# Patient Record
Sex: MALE | Race: OTHER | NOT HISPANIC OR LATINO | URBAN - METROPOLITAN AREA
[De-identification: names, ages, dates, MRNs, and addresses within clinical notes are randomized per-mention and may not be internally consistent; named-entity substitution may affect disease eponyms.]

---

## 2017-06-25 PROBLEM — Z00.00 ENCOUNTER FOR PREVENTIVE HEALTH EXAMINATION: Status: ACTIVE | Noted: 2017-06-25

## 2018-10-20 ENCOUNTER — EMERGENCY (EMERGENCY)
Facility: HOSPITAL | Age: 48
LOS: 0 days | Discharge: HOME | End: 2018-10-20
Admitting: PHYSICIAN ASSISTANT

## 2018-10-20 VITALS — SYSTOLIC BLOOD PRESSURE: 132 MMHG | HEART RATE: 70 BPM | DIASTOLIC BLOOD PRESSURE: 80 MMHG

## 2018-10-20 VITALS
RESPIRATION RATE: 18 BRPM | OXYGEN SATURATION: 98 % | SYSTOLIC BLOOD PRESSURE: 145 MMHG | TEMPERATURE: 98 F | HEART RATE: 82 BPM | DIASTOLIC BLOOD PRESSURE: 101 MMHG
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
## 2018-10-20 DIAGNOSIS — S92.352A DISPLACED FRACTURE OF FIFTH METATARSAL BONE, LEFT FOOT, INITIAL ENCOUNTER FOR CLOSED FRACTURE: ICD-10-CM
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
## 2018-10-20 DIAGNOSIS — M25.579 PAIN IN UNSPECIFIED ANKLE AND JOINTS OF UNSPECIFIED FOOT: ICD-10-CM

## 2018-10-20 RX ORDER — OXYCODONE AND ACETAMINOPHEN 5; 325 MG/1; MG/1
1 TABLET ORAL ONCE
Qty: 0 | Refills: 0 | Status: DISCONTINUED | OUTPATIENT
Start: 2018-10-20 | End: 2018-10-20

## 2018-10-20 RX ADMIN — OXYCODONE AND ACETAMINOPHEN 1 TABLET(S): 5; 325 TABLET ORAL at 17:36

## 2018-10-20 NOTE — ED PROVIDER NOTE - NS ED ROS FT
CONST: No fever, chills or bodyaches  EYES: No pain, redness, drainage or visual changes.  ENT: No ear pain or discharge, nasal discharge or congestion. No sore throat  CARD: No chest pain, palpitations  RESP: No SOB, cough, hemoptysis. No hx of asthma or COPD  MS:+L foot pain

## 2018-10-20 NOTE — ED PROVIDER NOTE - OBJECTIVE STATEMENT
Pt is a 47 yo M c/o L foot pain sp twisting injury last night. Pt sts he was getting up from using the toilet and twisted the ankle and heard a crack in foot. Sts he tried walking on it since yesterday but it was getting worse with swelling. No othe rinjuries.

## 2018-10-20 NOTE — ED PROVIDER NOTE - PHYSICAL EXAMINATION
CONST: Well appearing in NAD  EYES: PERRL, EOMI, Sclera and conjunctiva clear.  ENT: No nasal discharge. TM's clear B/L without drainage. Oropharynx normal appearing, no erythema or exudates. Uvula midline.  NECK: Non-tender, no meningeal signs  CARD: Normal S1 S2; Normal rate and rhythm  RESP: Equal BS B/L, No wheezes, rhonchi or rales. No distress  MS: Normal ROM in all extremities. +Tenderness with swelling over L 5th metatarsal

## 2018-10-20 NOTE — ED ADULT NURSE NOTE - NS ED NURSE DC PT EDUCATION RESOURCES
[facility]  [address], [place]   Phone: [phone]  Fax: [phone]        PROGRESS NOTE      HISTORY OF PRESENT ILLNESS:  The patient is a 48 y.o. female and was seen today for follow up of low back pain radiating into the BLE (L>R) extending in an S1 distribution to the ankle with paraesthesias. No specific injury/trauma. Her pain is not positional. Pt denies h/o lumbar spinal surgery or blocks. She has not attended physical therapy/chiropractor for the lumbar spine. I last saw patient on 6/9/13 neck pain into the LUE, with lesser symptoms in the RUE. Of note, her pain was unrelieved with Morphine. I referred her to Dr. Joseph Jacobson for surgical evaluation. This is a patient with a diagnosis of cervical postlaminectomy syndrome. She was previously seen by Dr. Joseph Jacobson on 4/24/14 and was noted to be 6 months out from her C4-T3 ACDF with significant relief. Of note, she should continue to be followed by Dr. Rosa Elena Pulido for pain management. Per patient, she was abruptly discontinued off her Oxycodone and Morphine by Dr. Rosa Elena Pulido and not weaned off roughly 2-3 years ago and wished to no longer be under his care. ER note dated 6/4/17 indicating patient presented for chronic low back pain. She was treated with Norco, Valium and Prednisone at that time. Pt is noting good relief with Valium and Prednisone. Note from Dr. Jennifer Fong dated 6/5/17 indicating patient was seen with c/o severe hand and back pain which are not relieved with Norco or NSAIDs. Raynaud's is controlled with Procardia and Trental. Of note, patient continues to use marijuana for pain control. It appears as though Dr. Jennifer Fong offered to refer patient to pain management which she declined. Pt reports somnolence with CYMBALTA 30 mg. She states she is no longer taking Zoloft, Xanax or Ambien. Noted, patient has taken Neurontin in the past but does not recall what dose she was placed on.  Pt denies change in bowel or bladder habits. Pt denies fever, weight loss, or skin changes. Pt denies h/o stomach ulcers or bleeding disorders. Lumbar spine CT dated 6/4/17 reviewed. Per report, no acute osseous abnormality of the lumbar spine. Transitional lumbosacral anatomy. multilevel degenerative disc disease and facet arthropathy. Atherosclerosis. Lumbar spine MRI dated 7/29/17 reviewed. Per report, transitional lumbosacral vertebra with partial lumbarization of S1, hypoplastic otherwise unremarkable S1-2 level. 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 Severe L3-4 and moderate L4-5 central stenosis, from a combination of disc bulging and spondylosis with outer annular fissure, associated mild to moderate facet joint osteoarthritis and ligamentum flavum thickening/buckling. Superimposed moderate-sized right posterolateral extruded L3-4 disc herniation with inferior migration of sizable fragment in the right anterior epidural space to nearly the L4-5 disc level, mild flattening of the right ventral thecal sac with impingement of the right traversing L4 and posterior defection of descending intrathecal roots. Moderate degenerative disc disease with mild disc bulging and spondylosis, L5-S1. Minimal disc bulge, L2-3. Mild multilevel foraminal stenosis but no evident exiting root impingement. The patient is RHD. At her last clinical appointment, patient was neurologically intact. She wished to attempt an increased dose of Lyrica. I increased her Lyrica to 150 mg BID. The patient returns today with continued paresthesias in her L calf. She rates pain 0/10, an improvement since her last visit (3/10). She has tolerated the increased dose of Lyrica (150 mg BID) well and reports additional benefit. She notes improvement in distal BLE paraesthesias.  reviewed.        Past Medical History:   Diagnosis Date    Cancer [facility])     Skin    Chronic airway obstruction, not elsewhere classified     Essential hypertension, benign     MILDLY ELEVATED    Nausea & vomiting     Nicotine vapor product user     Personal history of tobacco use, presenting hazards to health     Psychiatric disorder     Depression    Raynaud's disease     Shortness of breath     NORMAL STRESS TEST AND LV FUNCTION. LIKELY FROM COPD AND SMOKING        Social History     Social History    Marital status:      Spouse name: N/A    Number of children: N/A    Years of education: N/A     Occupational History    Not on file. Social History Main Topics    Smoking status: Former Smoker     Packs/day: 0.50     Years: 30.00    Smokeless tobacco: Never Used      Comment: Uses a Nicotine    Alcohol use No    Drug use: Not on file    Sexual activity: Not on file     Other Topics Concern    Not on file     Social History Narrative       Current Outpatient Prescriptions   Medication Sig Dispense Refill    pregabalin (LYRICA) 225 mg capsule Take 1 Cap by mouth two (2) times a day. Max Daily Amount: 450 mg. 60 Cap 1    pregabalin (LYRICA) 150 mg capsule Take 1 Cap by mouth two (2) times a day. Max Daily Amount: 300 mg. 60 Cap 1    diclofenac EC (VOLTAREN) 75 mg EC tablet Take  by mouth.  NIFEdipine XL (PROCARDIA XL) 30 mg tablet Take 60 mg by mouth daily. Indications: HYPERTENSION      HYDROcodone-acetaminophen (NORCO) 7.5-325 mg per tablet Take  by mouth.  diazePAM (VALIUM) 5 mg tablet Take 5 mg by mouth every six (6) hours as needed for Anxiety.  DULoxetine (CYMBALTA) 30 mg capsule Take 1 Cap by mouth daily. 30 Cap 1    oxyCODONE-acetaminophen (PERCOCET)  mg per tablet Take 1 Tab by mouth every eight (8) hours as needed. Indications: PAIN 90 Tab 0    Morphine (OTILIO) 30 mg ER capsule Take 1 Cap by mouth three (3) times daily. Indications: CHRONIC PAIN 90 Cap 0    sertraline (ZOLOFT) 100 mg tablet Take  by mouth daily. Indications: Depression      ALPRAZolam (XANAX) 0.5 mg tablet Take  by mouth three (3) times daily as needed.  Indications: ANXIETY WITH DEPRESSION      pentoxifylline CR (TRENTAL) 400 mg CR tablet Take 400 mg by mouth three (3) times daily (with meals).  primidone (MYSOLINE) 50 mg tablet Take  by mouth nightly. Indications: for tremors      zolpidem (AMBIEN) 10 mg tablet Take  by mouth nightly as needed. No Known Allergies      PHYSICAL EXAMINATION    Visit Vitals    /82 (BP 1 Location: Left arm, BP Patient Position: Sitting)    Pulse 64    Resp 16    Ht 5' 9\" (1.753 m)    Wt 133 lb 9.6 oz (60.6 kg)    SpO2 (!) 89%    BMI 19.73 kg/m2       CONSTITUTIONAL: NAD, A&O x 3  SENSATION: Intact to light touch throughout  RANGE OF MOTION: The patient has full passive range of motion in all four extremities. MOTOR:  Straight Leg Raise: Negative, bilateral               Hip Flex Knee Ext Knee Flex Ankle DF GTE Ankle PF Tone   Right +4/5 +4/5 +4/5 +4/5 +4/5 +4/5 +4/5   Left +4/5 +4/5 +4/5 +4/5 +4/5 +4/5 +4/5       ASSESSMENT   Diagnoses and all orders for this visit:    1. Spondylosis of lumbar region without myelopathy or radiculopathy  -     pregabalin (LYRICA) 225 mg capsule; Take 1 Cap by mouth two (2) times a day. Max Daily Amount: 450 mg.    2. Other intervertebral disc degeneration, lumbar region  -     pregabalin (LYRICA) 225 mg capsule; Take 1 Cap by mouth two (2) times a day. Max Daily Amount: 450 mg.    3. Radiculopathy, lumbar region  -     pregabalin (LYRICA) 225 mg capsule; Take 1 Cap by mouth two (2) times a day. Max Daily Amount: 450 mg.          IMPRESSION AND PLAN:  I will increase the pt's Lyrica to 225 mg BID. Patient advised to call the office if intolerant to higher dose. I will see the patient back in 1 month's time or earlier if needed. Written by Shanti Hernandez, as dictated by Tony Zarate MD  I examined the patient, reviewed and agree with the note. Yes

## 2022-04-20 ENCOUNTER — EMERGENCY (EMERGENCY)
Facility: HOSPITAL | Age: 52
LOS: 0 days | Discharge: HOME | End: 2022-04-20
Attending: EMERGENCY MEDICINE | Admitting: EMERGENCY MEDICINE
Payer: MEDICAID

## 2022-04-20 VITALS
DIASTOLIC BLOOD PRESSURE: 77 MMHG | HEIGHT: 68 IN | RESPIRATION RATE: 18 BRPM | HEART RATE: 79 BPM | TEMPERATURE: 98 F | SYSTOLIC BLOOD PRESSURE: 156 MMHG | OXYGEN SATURATION: 97 % | WEIGHT: 205.91 LBS

## 2022-04-20 DIAGNOSIS — M54.89 OTHER DORSALGIA: ICD-10-CM

## 2022-04-20 DIAGNOSIS — M54.9 DORSALGIA, UNSPECIFIED: ICD-10-CM

## 2022-04-20 PROCEDURE — 93010 ELECTROCARDIOGRAM REPORT: CPT

## 2022-04-20 PROCEDURE — 99284 EMERGENCY DEPT VISIT MOD MDM: CPT

## 2022-04-20 RX ORDER — METHOCARBAMOL 500 MG/1
1 TABLET, FILM COATED ORAL
Qty: 0 | Refills: 0 | DISCHARGE
Start: 2022-04-20 | End: 2022-04-22

## 2022-04-20 RX ORDER — KETOROLAC TROMETHAMINE 30 MG/ML
60 SYRINGE (ML) INJECTION ONCE
Refills: 0 | Status: DISCONTINUED | OUTPATIENT
Start: 2022-04-20 | End: 2022-04-20

## 2022-04-20 RX ORDER — METHOCARBAMOL 500 MG/1
2 TABLET, FILM COATED ORAL
Qty: 18 | Refills: 0
Start: 2022-04-20 | End: 2022-04-22

## 2022-04-20 RX ADMIN — Medication 60 MILLIGRAM(S): at 21:49

## 2022-04-20 NOTE — ED PROVIDER NOTE - ATTENDING CONTRIBUTION TO CARE
pt co right upper back pain for 1 week. sharp, spasm like, worse with movement. feels like a "knot". no cp or sob. no trauma.     pt in nad, ctab, rrr. tender to right para thoracic muscles, point tenderness +spasm. nvi. nfd.

## 2022-04-20 NOTE — ED PROVIDER NOTE - PATIENT PORTAL LINK FT
You can access the FollowMyHealth Patient Portal offered by [facility] by registering at the following website: [email or web address]. By joining [employer]’s FollowMyHealth portal, you will also be able to view your health information using other applications (apps) compatible with our system.

## 2022-04-20 NOTE — ED ADULT TRIAGE NOTE - CHIEF COMPLAINT QUOTE
I have some muscle cramp in the back for a week, its not getting better. I'm using heat packs, Motrin, nothing is working. Now it's going to my right arm - patient

## 2022-04-20 NOTE — ED PROVIDER NOTE - OBJECTIVE STATEMENT
52 year old with no phmx comes in for right paraspinal back pain for 1 week. pt denies any fevers, chills. pt's pain started 1 week ago and woke up with it. pt was prescribed tizanidine by a friend which helped slightly and also used tylenol and motrin. pt has no other complaints.

## 2022-04-20 NOTE — ED PROVIDER NOTE - PHYSICAL EXAMINATION
CONSTITUTIONAL: Well-developed; well-nourished; in no acute distress.   SKIN: warm, dry  HEAD: Normocephalic; atraumatic.  EYES: PERRL, EOMI, normal sclera and conjunctiva   ENT: No nasal discharge; airway clear.  NECK: Supple; non tender.  BACK: right upper back pain with point tenderness over scapular region  CARD:  Regular rate and rhythm.   RESP: NO inc WOB   ABD: soft ntnd  EXT: Normal ROM.    LYMPH: No acute cervical adenopathy.  NEURO: Alert, oriented, grossly unremarkable  PSYCH: Cooperative, appropriate.

## 2022-04-22 ENCOUNTER — EMERGENCY (EMERGENCY)
Facility: HOSPITAL | Age: 52
LOS: 0 days | Discharge: HOME | End: 2022-04-22
Attending: EMERGENCY MEDICINE | Admitting: EMERGENCY MEDICINE
Payer: MEDICAID

## 2022-04-22 VITALS
SYSTOLIC BLOOD PRESSURE: 141 MMHG | OXYGEN SATURATION: 99 % | HEIGHT: 68 IN | RESPIRATION RATE: 18 BRPM | HEART RATE: 67 BPM | DIASTOLIC BLOOD PRESSURE: 84 MMHG | WEIGHT: 207.9 LBS | TEMPERATURE: 97 F

## 2022-04-22 DIAGNOSIS — M54.9 DORSALGIA, UNSPECIFIED: ICD-10-CM

## 2022-04-22 DIAGNOSIS — M62.830 MUSCLE SPASM OF BACK: ICD-10-CM

## 2022-04-22 DIAGNOSIS — F17.200 NICOTINE DEPENDENCE, UNSPECIFIED, UNCOMPLICATED: ICD-10-CM

## 2022-04-22 PROCEDURE — 99284 EMERGENCY DEPT VISIT MOD MDM: CPT

## 2022-04-22 RX ORDER — LIDOCAINE 4 G/100G
1 CREAM TOPICAL
Qty: 3 | Refills: 0
Start: 2022-04-22 | End: 2022-04-24

## 2022-04-22 RX ORDER — KETOROLAC TROMETHAMINE 30 MG/ML
30 SYRINGE (ML) INJECTION ONCE
Refills: 0 | Status: DISCONTINUED | OUTPATIENT
Start: 2022-04-22 | End: 2022-04-22

## 2022-04-22 RX ADMIN — Medication 30 MILLIGRAM(S): at 14:01

## 2022-04-22 NOTE — ED PROVIDER NOTE - NSFOLLOWUPCLINICS_GEN_ALL_ED_FT
[facility] ([facility])  Rehabilitation  [facility] [place] [address], [address] [place] [address]  Phone: [phone]  Fax:

## 2022-04-22 NOTE — ED PROVIDER NOTE - PATIENT PORTAL LINK FT
You can access the FollowMyHealth Patient Portal offered by [facility] by registering at the following website: [email or web address]. By joining [employer]’s FollowMyHealth portal, you will also be able to view your health information using other applications (apps) compatible with our system.

## 2022-04-22 NOTE — ED PROVIDER NOTE - OBJECTIVE STATEMENT
52 year old M no pmhx c/o rt upper back pain x 1 week. Pt sts woke up witjh a "knot" in his back. Pt was seen in ed 4/20 for similar symptoms. Sts symptoms improve with heat and with massaging the area. Denies 52 year old M no pmhx c/o rt upper back pain x 1 week. Pt sts woke up with a "knot" in his back. Pt was seen in ed 4/20 for similar symptoms. Sts symptoms improve with heat and with massaging the area. Denies any trauma or injuries, numbness/tingling, neck pain, skin changes/rashes, fever/chills IVDA, chest pain, sob, lower back pain, LE weakness/numbness, difficulty ambulating, headache.

## 2022-04-22 NOTE — ED PROVIDER NOTE - NS ED ATTENDING STATEMENT MOD
This was a shared visit with the CRIS. I reviewed and verified the documentation and independently performed the documented:

## 2022-04-22 NOTE — ED PROVIDER NOTE - ATTENDING APP SHARED VISIT CONTRIBUTION OF CARE
53 yo m with pmh of R upper back pain.  pt says started 1 week ago.  has a knot in his back.  shows a picture of the "rhomboid muscle".  pt says has tried muscle relaxant and massaging area with some relief.  pt says she needs to be referred to somebody else.  exam: nad, ncat, perrl, eomi, mmm, rrr, ctab, ttp R inferior trapezius, muscle spasm, imp: pt with muscle spasm, symptomatic tx, refer to pt outpt

## 2022-04-22 NOTE — ED PROVIDER NOTE - CARE PROVIDER_API CALL
Ronaldo Torres; MPH)  Anesthesiology; Pain Medicine  [address] [place] [address]  Phone: [phone]  Fax: [phone]  Follow Up Time:

## 2022-04-22 NOTE — ED PROVIDER NOTE - PHYSICAL EXAMINATION
CONSTITUTIONAL: Well-appearing; well-nourished; in no apparent distress.   EYES: PERRL; EOM intact.   ENT: normal nose; no rhinorrhea; normal pharynx with no tonsillar hypertrophy.   NECK: Supple; non-tender; no cervical lymphadenopathy.   CARDIOVASCULAR: Normal S1, S2; no murmurs, rubs, or gallops.   RESPIRATORY: Normal chest excursion with respiration; breath sounds clear and equal bilaterally; no wheezes, rhonchi, or rales.  GI/: Normal bowel sounds; non-distended; non-tender; no palpable organomegaly.   MS: No evidence of trauma or deformity. no midline spinal ttp. + ttp to rt upper back/scapular region. Strength equal b/l 5/5 throughout. distal pulses are normal.   SKIN: Normal for age and race; warm; dry; good turgor; no apparent lesions or exudate.   NEURO/PSYCH: A & O x 4; grossly unremarkable. mood and manner are appropriate. no focal deficits. Sensation intact. normal gait

## 2022-04-22 NOTE — ED ADULT NURSE NOTE - DISCHARGE DATE/TIME
Patient presented for her depo injection, date verified, injection given, patient tolerated well, dates of next injection given.   
22-Apr-2022 14:12

## 2022-04-22 NOTE — ED PROVIDER NOTE - NS ED ROS FT
Constitutional: no fever, chills, no recent weight loss, change in appetite or malaise  Eyes: no redness/discharge/pain/vision changes  ENT: no rhinorrhea/ear pain/sore throat  Cardiac: No chest pain, SOB or edema.  Respiratory: No cough or respiratory distress  GI: No nausea, vomiting, diarrhea or abdominal pain.  : No dysuria, frequency, urgency or hematuria  MS: + L upper back pain. no loss of ROM, no weakness  Neuro: No headache or weakness. No LOC.  Skin: No skin rash.  Endocrine: No history of thyroid disease or diabetes.  Except as documented in the HPI, all other systems are negative.

## 2022-05-09 ENCOUNTER — EMERGENCY (EMERGENCY)
Facility: HOSPITAL | Age: 52
LOS: 0 days | Discharge: HOME | End: 2022-05-09
Attending: EMERGENCY MEDICINE | Admitting: EMERGENCY MEDICINE
Payer: MEDICAID

## 2022-05-09 VITALS
SYSTOLIC BLOOD PRESSURE: 170 MMHG | RESPIRATION RATE: 19 BRPM | DIASTOLIC BLOOD PRESSURE: 84 MMHG | HEIGHT: 68 IN | TEMPERATURE: 97 F | OXYGEN SATURATION: 97 % | HEART RATE: 100 BPM

## 2022-05-09 DIAGNOSIS — M54.9 DORSALGIA, UNSPECIFIED: ICD-10-CM

## 2022-05-09 PROCEDURE — 99284 EMERGENCY DEPT VISIT MOD MDM: CPT

## 2022-05-09 RX ORDER — DIAZEPAM 5 MG
5 TABLET ORAL ONCE
Refills: 0 | Status: DISCONTINUED | OUTPATIENT
Start: 2022-05-09 | End: 2022-05-09

## 2022-05-09 RX ORDER — TIZANIDINE 4 MG/1
2 TABLET ORAL
Qty: 24 | Refills: 0
Start: 2022-05-09 | End: 2022-05-12

## 2022-05-09 RX ORDER — KETOROLAC TROMETHAMINE 30 MG/ML
1 SYRINGE (ML) INJECTION
Qty: 20 | Refills: 0
Start: 2022-05-09 | End: 2022-05-13

## 2022-05-09 RX ORDER — KETOROLAC TROMETHAMINE 30 MG/ML
60 SYRINGE (ML) INJECTION ONCE
Refills: 0 | Status: DISCONTINUED | OUTPATIENT
Start: 2022-05-09 | End: 2022-05-09

## 2022-05-09 RX ADMIN — Medication 60 MILLIGRAM(S): at 19:30

## 2022-05-09 RX ADMIN — Medication 40 MILLIGRAM(S): at 18:14

## 2022-05-09 RX ADMIN — Medication 5 MILLIGRAM(S): at 18:14

## 2022-05-09 NOTE — ED PROVIDER NOTE - NSFOLLOWUPCLINICS_GEN_ALL_ED_FT
[facility] ([facility])  Rehabilitation  [facility] [place] [address], [address] [place] [address]  Phone: [phone]  Fax:     [facility] ([facility])  Rehabilitation  [address] [place] [id number]  Phone: [phone]  Fax:

## 2022-05-09 NOTE — ED PROVIDER NOTE - PATIENT PORTAL LINK FT
You can access the FollowMyHealth Patient Portal offered by [facility] by registering at the following website: [email or web address]. By joining [employer]’s FollowMyHealth portal, you will also be able to view your health information using other applications (apps) compatible with our system.

## 2022-05-09 NOTE — ED PROVIDER NOTE - PHYSICAL EXAMINATION
PHYSICAL EXAM: I have reviewed current vital signs.  GENERAL: NAD, well-nourished; well-developed.  HEAD:  Normocephalic, atraumatic.  EYES: EOMI, PERRL, conjunctiva and sclera clear.  ENT: MMM, no erythema/exudates.  NECK: Supple, no JVD.  CHEST/LUNG: Clear to auscultation bilaterally; no wheezes, rales, or rhonchi.  HEART: Regular rate and rhythm, normal S1 and S2; no murmurs, rubs, or gallops.  ABDOMEN: Soft, nontender, nondistended.  Back: no thoracic paraspinal or central spinal tenderness  EXTREMITIES:  2+ peripheral pulses; no clubbing, cyanosis, or edema.  PSYCH: Cooperative, appropriate, normal mood and affect.  NEUROLOGY: A&O x 3. Motor 5/5. Sensory intact. No focal neurological deficits. CN II - XII intact. (-) dysmetria, facial droop, pronator drift.  SKIN: Warm and dry.

## 2022-05-09 NOTE — ED PROVIDER NOTE - CLINICAL SUMMARY MEDICAL DECISION MAKING FREE TEXT BOX
Patient presented with thoracic back pain x 2 weeks. Otherwise afebrile, HD stable, neurovascularly intact. No red flags in hx or on exam. Exam consistent with muscular pain. Given tx in ED with significant improvement of pain after which time patient ambulatory without difficulty. Given the above, will discharge home with outpatient follow up. Patient agreeable with plan. Agrees to return to ED for any new or worsening symptoms.

## 2022-05-09 NOTE — ED PROVIDER NOTE - OBJECTIVE STATEMENT
52 y male with no PMH presents with Upper right thoracic back pain radiating to his arm.  patient seen in the ED 2x in the past 2 weeks for the same and d/c with robaxin with minimal relief of symptoms. patient states he did not follow up with neurology .  denies loss of sensation or motor function.

## 2022-05-09 NOTE — ED PROVIDER NOTE - NS ED ROS FT
Constitutional:  No fevers or chills.  Eyes:  No visual changes, eye pain, or discharge.  ENT:  No hearing changes. No sore throat.  Neck:  No neck pain or stiffness.  Cardiac:  No CP or edema.  Resp:  No cough or SOB. No hemoptysis.   GI:  No nausea, vomiting, diarrhea, or abdominal pain.  :  No dysuria, frequency, or hematuria.  MSK:  (+) back pain  (+) arm pain  No myalgias or joint pain/swelling.  Neuro:  No headache, dizziness, or weakness.  Skin:  No skin rash.

## 2022-05-17 ENCOUNTER — OUTPATIENT (OUTPATIENT)
Dept: OUTPATIENT SERVICES | Facility: HOSPITAL | Age: 52
LOS: 1 days | Discharge: HOME | End: 2022-05-17
Payer: MEDICAID

## 2022-05-17 DIAGNOSIS — M54.9 DORSALGIA, UNSPECIFIED: ICD-10-CM

## 2022-05-17 PROCEDURE — 72050 X-RAY EXAM NECK SPINE 4/5VWS: CPT | Mod: 26

## 2022-05-17 PROCEDURE — 72074 X-RAY EXAM THORAC SPINE4/>VW: CPT | Mod: 26

## 2022-05-17 PROCEDURE — 72110 X-RAY EXAM L-2 SPINE 4/>VWS: CPT | Mod: 26

## 2022-05-30 ENCOUNTER — INPATIENT (INPATIENT)
Facility: HOSPITAL | Age: 52
LOS: 2 days | Discharge: HOME | End: 2022-06-02
Attending: INTERNAL MEDICINE | Admitting: INTERNAL MEDICINE
Payer: MEDICAID

## 2022-05-30 VITALS
TEMPERATURE: 98 F | HEIGHT: 68 IN | SYSTOLIC BLOOD PRESSURE: 197 MMHG | HEART RATE: 82 BPM | DIASTOLIC BLOOD PRESSURE: 94 MMHG | WEIGHT: 201.94 LBS | OXYGEN SATURATION: 100 % | RESPIRATION RATE: 18 BRPM

## 2022-05-30 LAB
ALBUMIN SERPL ELPH-MCNC: 4.4 G/DL — SIGNIFICANT CHANGE UP (ref 3.5–5.2)
ALP SERPL-CCNC: 144 U/L — HIGH (ref 30–115)
ALT FLD-CCNC: 43 U/L — HIGH (ref 0–41)
ANION GAP SERPL CALC-SCNC: 12 MMOL/L — SIGNIFICANT CHANGE UP (ref 7–14)
AST SERPL-CCNC: 27 U/L — SIGNIFICANT CHANGE UP (ref 0–41)
BASOPHILS # BLD AUTO: 0.04 K/UL — SIGNIFICANT CHANGE UP (ref 0–0.2)
BASOPHILS NFR BLD AUTO: 0.3 % — SIGNIFICANT CHANGE UP (ref 0–1)
BILIRUB SERPL-MCNC: 0.3 MG/DL — SIGNIFICANT CHANGE UP (ref 0.2–1.2)
BUN SERPL-MCNC: 19 MG/DL — SIGNIFICANT CHANGE UP (ref 10–20)
CALCIUM SERPL-MCNC: 9.1 MG/DL — SIGNIFICANT CHANGE UP (ref 8.5–10.1)
CHLORIDE SERPL-SCNC: 106 MMOL/L — SIGNIFICANT CHANGE UP (ref 98–110)
CO2 SERPL-SCNC: 23 MMOL/L — SIGNIFICANT CHANGE UP (ref 17–32)
CREAT SERPL-MCNC: 1 MG/DL — SIGNIFICANT CHANGE UP (ref 0.7–1.5)
EGFR: 91 ML/MIN/1.73M2 — SIGNIFICANT CHANGE UP
EOSINOPHIL # BLD AUTO: 0.14 K/UL — SIGNIFICANT CHANGE UP (ref 0–0.7)
EOSINOPHIL NFR BLD AUTO: 1.1 % — SIGNIFICANT CHANGE UP (ref 0–8)
GLUCOSE SERPL-MCNC: 109 MG/DL — HIGH (ref 70–99)
HCT VFR BLD CALC: 44.3 % — SIGNIFICANT CHANGE UP (ref 42–52)
HGB BLD-MCNC: 15.8 G/DL — SIGNIFICANT CHANGE UP (ref 14–18)
IMM GRANULOCYTES NFR BLD AUTO: 0.5 % — HIGH (ref 0.1–0.3)
LYMPHOCYTES # BLD AUTO: 26.2 % — SIGNIFICANT CHANGE UP (ref 20.5–51.1)
LYMPHOCYTES # BLD AUTO: 3.23 K/UL — SIGNIFICANT CHANGE UP (ref 1.2–3.4)
MAGNESIUM SERPL-MCNC: 2.2 MG/DL — SIGNIFICANT CHANGE UP (ref 1.8–2.4)
MCHC RBC-ENTMCNC: 31.3 PG — HIGH (ref 27–31)
MCHC RBC-ENTMCNC: 35.7 G/DL — SIGNIFICANT CHANGE UP (ref 32–37)
MCV RBC AUTO: 87.7 FL — SIGNIFICANT CHANGE UP (ref 80–94)
MONOCYTES # BLD AUTO: 0.83 K/UL — HIGH (ref 0.1–0.6)
MONOCYTES NFR BLD AUTO: 6.7 % — SIGNIFICANT CHANGE UP (ref 1.7–9.3)
NEUTROPHILS # BLD AUTO: 8.01 K/UL — HIGH (ref 1.4–6.5)
NEUTROPHILS NFR BLD AUTO: 65.2 % — SIGNIFICANT CHANGE UP (ref 42.2–75.2)
NRBC # BLD: 0 /100 WBCS — SIGNIFICANT CHANGE UP (ref 0–0)
NT-PROBNP SERPL-SCNC: 5 PG/ML — SIGNIFICANT CHANGE UP (ref 0–300)
PLATELET # BLD AUTO: 245 K/UL — SIGNIFICANT CHANGE UP (ref 130–400)
POTASSIUM SERPL-MCNC: 4.1 MMOL/L — SIGNIFICANT CHANGE UP (ref 3.5–5)
POTASSIUM SERPL-SCNC: 4.1 MMOL/L — SIGNIFICANT CHANGE UP (ref 3.5–5)
PROT SERPL-MCNC: 7.4 G/DL — SIGNIFICANT CHANGE UP (ref 6–8)
RBC # BLD: 5.05 M/UL — SIGNIFICANT CHANGE UP (ref 4.7–6.1)
RBC # FLD: 12.6 % — SIGNIFICANT CHANGE UP (ref 11.5–14.5)
SODIUM SERPL-SCNC: 141 MMOL/L — SIGNIFICANT CHANGE UP (ref 135–146)
TROPONIN T SERPL-MCNC: 0.09 NG/ML — CRITICAL HIGH
WBC # BLD: 12.31 K/UL — HIGH (ref 4.8–10.8)
WBC # FLD AUTO: 12.31 K/UL — HIGH (ref 4.8–10.8)

## 2022-05-30 PROCEDURE — 93010 ELECTROCARDIOGRAM REPORT: CPT

## 2022-05-30 PROCEDURE — 71046 X-RAY EXAM CHEST 2 VIEWS: CPT | Mod: 26

## 2022-05-30 PROCEDURE — 99285 EMERGENCY DEPT VISIT HI MDM: CPT

## 2022-05-30 NOTE — ED PROVIDER NOTE - OBJECTIVE STATEMENT
52-year-old male with past medical history of CAD status post 1 stent, and cervical radiculopathy presents to the ED complaining of moderate left-sided chest pain that radiates to his left arm.  Pain was pressure-like, constant, associated with elevated BP.  EMS brought patient to [facility], was given 1 sublingual nitro and 324 mg ASA with improvement in symptoms.  Patient did not stay to be evaluated at [facility] and left to get care here.  Patient has not followed up with a cardiologist for several years.  Patient only taking baby aspirin occasionally now, along with gabapentin and methocarbamol for neck pain.  Patient states pain has improved greatly since onset and now he feels slight pinching in the left side of his chest.  He denies other complaints. Pt denies fever, chills, nausea, vomiting, abdominal pain, diarrhea, headache, dizziness, weakness, SOB, back pain, LOC, trauma, urinary symptoms, cough, calf pain/swelling, recent travel, recent surgery.

## 2022-05-30 NOTE — ED PROVIDER NOTE - ATTENDING APP SHARED VISIT CONTRIBUTION OF CARE
52-year-old male with past medical history of CAD status post 1 stent, and cervical disc disease presents to the ED complaining of moderate left-sided chest pain that radiates to his left arm that began today around 4:30 or 5 PM.  Pain was pressure-like, constant, associated with elevated BP (wife states it went up to "214/196") and nausea .  Denies sweats/dizziness/palpitations/SOB/abdomen pain/mid back pain/leg swelling or calf tenderness.  EMS brought patient to [facility], after giving him 1 sublingual nitro and 324 mg ASA with improvement in symptoms.  Patient did not stay to be evaluated at [facility] and left to get care here.  Patient has not followed up with a cardiologist for several years.  Patient only taking baby aspirin occasionally now, along with gabapentin and methocarbamol for his neck pain.  Patient states pain has improved greatly since onset and now he feels slight pinching in the left side of his chest.      On exam NCAT, PERRL, EOMI, no carotid bruits, Lungs: CTAB, Heart: reg S1S2, no murmur, No chest wall tenderness, Abdomen: soft NT/ND +BS, no mass, Ext: no edema, no calf tenderness, distal pulses intact, Neuro intact    A/P CP r/o ACS workup. Check labs, ekg, XR and reassess.    ALL: nkda  PMH as above  Meds: methocarbamol 750 q 6 hr, Gabapentin 100 tid, (stopped taking Tylenol/ibuprofen since on these meds)  SH +smoker, no ETOH  PMD Raphael Frost

## 2022-05-30 NOTE — ED ADULT TRIAGE NOTE - CHIEF COMPLAINT QUOTE
Pt presents to the ED with c/o of high BP and chest pain. Pt states his BP at home was 220/119. Pt called EMS and was sent to [facility]. En route to [facility] pt was given 1 tab of Nitro sublingual and aspirin 243mg aspirin. Pt didn't like [facility] left to come here. Pt endorses his chest pain has improved but still feels "slight pinch". Pt h/x of 1 cardiac stent. EKG done in triage

## 2022-05-30 NOTE — ED PROVIDER NOTE - PROGRESS NOTE DETAILS
Narendra: pt taking his own methacarbamol 750 mg and gabapentin 200 mg now as he states he needs them every 6 hours for his neck/upper back pain

## 2022-05-30 NOTE — ED PROVIDER NOTE - PHYSICAL EXAMINATION
VITAL SIGNS: I have reviewed nursing notes and confirm.  CONSTITUTIONAL: Well-developed; well-nourished; in no acute distress.  SKIN: Skin exam is warm and dry, no acute rash.  HEAD: Normocephalic; atraumatic.  EYES: Conjunctiva and sclera clear.  ENT: No nasal discharge; airway clear.   NECK: Supple; non tender.  CARD: S1, S2 normal; no murmurs, gallops, or rubs. Regular rate and rhythm.  RESP: No wheezes, rales or rhonchi. Speaking in full sentences.   ABD: Normal bowel sounds; soft; non-distended; non-tender; No rebound or guarding. No CVA tenderness.  EXT: Normal ROM. No clubbing, cyanosis or edema. No calf TTP or swelling. Radial and DP 2+ B/L and equal.   NEURO: Alert, oriented. Grossly unremarkable. No focal deficits.

## 2022-05-30 NOTE — ED PROVIDER NOTE - CLINICAL SUMMARY MEDICAL DECISION MAKING FREE TEXT BOX
52-year-old male with past medical history of CAD status post 1 stent, and cervical disc disease presents to the ED complaining of moderate left-sided chest pain that radiates to his left arm that began today around 4:30 or 5 PM.  Pain was pressure-like, constant, associated with elevated BP (wife states it went up to "214/196") and nausea. Labs, ekg, XR reviewed. Elevated Trop. To admit for further workup to r/o ACS

## 2022-05-31 LAB
A1C WITH ESTIMATED AVERAGE GLUCOSE RESULT: 5.7 % — HIGH (ref 4–5.6)
ALBUMIN SERPL ELPH-MCNC: 4.2 G/DL — SIGNIFICANT CHANGE UP (ref 3.5–5.2)
ALP SERPL-CCNC: 147 U/L — HIGH (ref 30–115)
ALT FLD-CCNC: 54 U/L — HIGH (ref 0–41)
ANION GAP SERPL CALC-SCNC: 14 MMOL/L — SIGNIFICANT CHANGE UP (ref 7–14)
APTT BLD: 30.7 SEC — SIGNIFICANT CHANGE UP (ref 27–39.2)
APTT BLD: 39.1 SEC — SIGNIFICANT CHANGE UP (ref 27–39.2)
APTT BLD: 51.8 SEC — HIGH (ref 27–39.2)
APTT BLD: 53 SEC — HIGH (ref 27–39.2)
AST SERPL-CCNC: 126 U/L — HIGH (ref 0–41)
BILIRUB SERPL-MCNC: 0.4 MG/DL — SIGNIFICANT CHANGE UP (ref 0.2–1.2)
BUN SERPL-MCNC: 13 MG/DL — SIGNIFICANT CHANGE UP (ref 10–20)
CALCIUM SERPL-MCNC: 9 MG/DL — SIGNIFICANT CHANGE UP (ref 8.5–10.1)
CHLORIDE SERPL-SCNC: 103 MMOL/L — SIGNIFICANT CHANGE UP (ref 98–110)
CHOLEST SERPL-MCNC: 206 MG/DL — HIGH
CK MB CFR SERPL CALC: 194.8 NG/ML — HIGH (ref 0.6–6.3)
CK MB CFR SERPL CALC: 99.5 NG/ML — HIGH (ref 0.6–6.3)
CO2 SERPL-SCNC: 22 MMOL/L — SIGNIFICANT CHANGE UP (ref 17–32)
CREAT SERPL-MCNC: 0.7 MG/DL — SIGNIFICANT CHANGE UP (ref 0.7–1.5)
EGFR: 112 ML/MIN/1.73M2 — SIGNIFICANT CHANGE UP
ESTIMATED AVERAGE GLUCOSE: 117 MG/DL — HIGH (ref 68–114)
GLUCOSE SERPL-MCNC: 118 MG/DL — HIGH (ref 70–99)
HCT VFR BLD CALC: 44.9 % — SIGNIFICANT CHANGE UP (ref 42–52)
HDLC SERPL-MCNC: 25 MG/DL — LOW
HGB BLD-MCNC: 15.7 G/DL — SIGNIFICANT CHANGE UP (ref 14–18)
INR BLD: 0.96 RATIO — SIGNIFICANT CHANGE UP (ref 0.65–1.3)
INR BLD: 1.04 RATIO — SIGNIFICANT CHANGE UP (ref 0.65–1.3)
LIPID PNL WITH DIRECT LDL SERPL: 137 MG/DL — HIGH
MCHC RBC-ENTMCNC: 30.6 PG — SIGNIFICANT CHANGE UP (ref 27–31)
MCHC RBC-ENTMCNC: 35 G/DL — SIGNIFICANT CHANGE UP (ref 32–37)
MCV RBC AUTO: 87.5 FL — SIGNIFICANT CHANGE UP (ref 80–94)
NON HDL CHOLESTEROL: 181 MG/DL — HIGH
NRBC # BLD: 0 /100 WBCS — SIGNIFICANT CHANGE UP (ref 0–0)
PLATELET # BLD AUTO: 263 K/UL — SIGNIFICANT CHANGE UP (ref 130–400)
POTASSIUM SERPL-MCNC: 3.8 MMOL/L — SIGNIFICANT CHANGE UP (ref 3.5–5)
POTASSIUM SERPL-SCNC: 3.8 MMOL/L — SIGNIFICANT CHANGE UP (ref 3.5–5)
PROT SERPL-MCNC: 7.3 G/DL — SIGNIFICANT CHANGE UP (ref 6–8)
PROTHROM AB SERPL-ACNC: 11 SEC — SIGNIFICANT CHANGE UP (ref 9.95–12.87)
PROTHROM AB SERPL-ACNC: 12 SEC — SIGNIFICANT CHANGE UP (ref 9.95–12.87)
RBC # BLD: 5.13 M/UL — SIGNIFICANT CHANGE UP (ref 4.7–6.1)
RBC # FLD: 12.3 % — SIGNIFICANT CHANGE UP (ref 11.5–14.5)
SARS-COV-2 RNA SPEC QL NAA+PROBE: SIGNIFICANT CHANGE UP
SODIUM SERPL-SCNC: 139 MMOL/L — SIGNIFICANT CHANGE UP (ref 135–146)
TRIGL SERPL-MCNC: 218 MG/DL — HIGH
TROPONIN T SERPL-MCNC: 0.44 NG/ML — CRITICAL HIGH
TROPONIN T SERPL-MCNC: 0.86 NG/ML — CRITICAL HIGH
WBC # BLD: 10.23 K/UL — SIGNIFICANT CHANGE UP (ref 4.8–10.8)
WBC # FLD AUTO: 10.23 K/UL — SIGNIFICANT CHANGE UP (ref 4.8–10.8)

## 2022-05-31 PROCEDURE — 99223 1ST HOSP IP/OBS HIGH 75: CPT

## 2022-05-31 PROCEDURE — 93306 TTE W/DOPPLER COMPLETE: CPT | Mod: 26

## 2022-05-31 RX ORDER — ASPIRIN/CALCIUM CARB/MAGNESIUM 324 MG
81 TABLET ORAL DAILY
Refills: 0 | Status: DISCONTINUED | OUTPATIENT
Start: 2022-05-31 | End: 2022-06-02

## 2022-05-31 RX ORDER — GABAPENTIN 400 MG/1
100 CAPSULE ORAL DAILY
Refills: 0 | Status: DISCONTINUED | OUTPATIENT
Start: 2022-05-31 | End: 2022-06-02

## 2022-05-31 RX ORDER — GABAPENTIN 400 MG/1
1 CAPSULE ORAL
Qty: 0 | Refills: 0 | DISCHARGE

## 2022-05-31 RX ORDER — PANTOPRAZOLE SODIUM 20 MG/1
40 TABLET, DELAYED RELEASE ORAL
Refills: 0 | Status: DISCONTINUED | OUTPATIENT
Start: 2022-05-31 | End: 2022-06-02

## 2022-05-31 RX ORDER — ACETAMINOPHEN 500 MG
650 TABLET ORAL EVERY 6 HOURS
Refills: 0 | Status: DISCONTINUED | OUTPATIENT
Start: 2022-05-31 | End: 2022-06-02

## 2022-05-31 RX ORDER — HEPARIN SODIUM 5000 [USP'U]/ML
5600 INJECTION INTRAVENOUS; SUBCUTANEOUS EVERY 6 HOURS
Refills: 0 | Status: DISCONTINUED | OUTPATIENT
Start: 2022-05-31 | End: 2022-06-01

## 2022-05-31 RX ORDER — ENOXAPARIN SODIUM 100 MG/ML
40 INJECTION SUBCUTANEOUS EVERY 24 HOURS
Refills: 0 | Status: DISCONTINUED | OUTPATIENT
Start: 2022-05-31 | End: 2022-05-31

## 2022-05-31 RX ORDER — CLOPIDOGREL BISULFATE 75 MG/1
75 TABLET, FILM COATED ORAL DAILY
Refills: 0 | Status: DISCONTINUED | OUTPATIENT
Start: 2022-05-31 | End: 2022-06-01

## 2022-05-31 RX ORDER — METOPROLOL TARTRATE 50 MG
12.5 TABLET ORAL
Refills: 0 | Status: DISCONTINUED | OUTPATIENT
Start: 2022-05-31 | End: 2022-06-02

## 2022-05-31 RX ORDER — METHOCARBAMOL 500 MG/1
750 TABLET, FILM COATED ORAL EVERY 6 HOURS
Refills: 0 | Status: DISCONTINUED | OUTPATIENT
Start: 2022-05-31 | End: 2022-06-02

## 2022-05-31 RX ORDER — GABAPENTIN 400 MG/1
200 CAPSULE ORAL AT BEDTIME
Refills: 0 | Status: DISCONTINUED | OUTPATIENT
Start: 2022-05-31 | End: 2022-06-02

## 2022-05-31 RX ORDER — POTASSIUM CHLORIDE 20 MEQ
20 PACKET (EA) ORAL ONCE
Refills: 0 | Status: COMPLETED | OUTPATIENT
Start: 2022-05-31 | End: 2022-05-31

## 2022-05-31 RX ORDER — CHLORHEXIDINE GLUCONATE 213 G/1000ML
1 SOLUTION TOPICAL
Refills: 0 | Status: DISCONTINUED | OUTPATIENT
Start: 2022-05-31 | End: 2022-06-02

## 2022-05-31 RX ORDER — LABETALOL HCL 100 MG
10 TABLET ORAL ONCE
Refills: 0 | Status: COMPLETED | OUTPATIENT
Start: 2022-05-31 | End: 2022-05-31

## 2022-05-31 RX ORDER — KETOROLAC TROMETHAMINE 30 MG/ML
15 SYRINGE (ML) INJECTION ONCE
Refills: 0 | Status: DISCONTINUED | OUTPATIENT
Start: 2022-05-31 | End: 2022-05-31

## 2022-05-31 RX ORDER — HEPARIN SODIUM 5000 [USP'U]/ML
5000 INJECTION INTRAVENOUS; SUBCUTANEOUS ONCE
Refills: 0 | Status: DISCONTINUED | OUTPATIENT
Start: 2022-05-31 | End: 2022-06-01

## 2022-05-31 RX ORDER — GABAPENTIN 400 MG/1
2 CAPSULE ORAL
Qty: 0 | Refills: 0 | DISCHARGE

## 2022-05-31 RX ORDER — HEPARIN SODIUM 5000 [USP'U]/ML
INJECTION INTRAVENOUS; SUBCUTANEOUS
Qty: 25000 | Refills: 0 | Status: DISCONTINUED | OUTPATIENT
Start: 2022-05-31 | End: 2022-06-01

## 2022-05-31 RX ADMIN — METHOCARBAMOL 750 MILLIGRAM(S): 500 TABLET, FILM COATED ORAL at 17:01

## 2022-05-31 RX ADMIN — Medication 12.5 MILLIGRAM(S): at 04:18

## 2022-05-31 RX ADMIN — Medication 10 MILLIGRAM(S): at 05:32

## 2022-05-31 RX ADMIN — PANTOPRAZOLE SODIUM 40 MILLIGRAM(S): 20 TABLET, DELAYED RELEASE ORAL at 05:35

## 2022-05-31 RX ADMIN — GABAPENTIN 100 MILLIGRAM(S): 400 CAPSULE ORAL at 11:11

## 2022-05-31 RX ADMIN — METHOCARBAMOL 750 MILLIGRAM(S): 500 TABLET, FILM COATED ORAL at 11:11

## 2022-05-31 RX ADMIN — GABAPENTIN 200 MILLIGRAM(S): 400 CAPSULE ORAL at 22:19

## 2022-05-31 RX ADMIN — METHOCARBAMOL 750 MILLIGRAM(S): 500 TABLET, FILM COATED ORAL at 05:35

## 2022-05-31 RX ADMIN — HEPARIN SODIUM 1200 UNIT(S)/HR: 5000 INJECTION INTRAVENOUS; SUBCUTANEOUS at 18:41

## 2022-05-31 RX ADMIN — METHOCARBAMOL 750 MILLIGRAM(S): 500 TABLET, FILM COATED ORAL at 23:43

## 2022-05-31 RX ADMIN — Medication 81 MILLIGRAM(S): at 11:11

## 2022-05-31 RX ADMIN — Medication 15 MILLIGRAM(S): at 17:24

## 2022-05-31 RX ADMIN — Medication 12.5 MILLIGRAM(S): at 17:01

## 2022-05-31 RX ADMIN — Medication 15 MILLIGRAM(S): at 17:54

## 2022-05-31 RX ADMIN — Medication 20 MILLIEQUIVALENT(S): at 11:56

## 2022-05-31 RX ADMIN — HEPARIN SODIUM 1200 UNIT(S)/HR: 5000 INJECTION INTRAVENOUS; SUBCUTANEOUS at 11:14

## 2022-05-31 RX ADMIN — HEPARIN SODIUM 1000 UNIT(S)/HR: 5000 INJECTION INTRAVENOUS; SUBCUTANEOUS at 04:50

## 2022-05-31 RX ADMIN — CLOPIDOGREL BISULFATE 75 MILLIGRAM(S): 75 TABLET, FILM COATED ORAL at 11:11

## 2022-05-31 NOTE — H&P ADULT - NSHPLABSRESULTS_GEN_ALL_CORE
15.8   12.31 )-----------( 245      ( 30 May 2022 22:12 )             44.3       05-30    141  |  106  |  19  ----------------------------<  109<H>  4.1   |  23  |  1.0    Ca    9.1      30 May 2022 22:12  Mg     2.2     05-30    TPro  7.4  /  Alb  4.4  /  TBili  0.3  /  DBili  x   /  AST  27  /  ALT  43<H>  /  AlkPhos  144<H>  05-30                      Lactate Trend      CARDIAC MARKERS ( 30 May 2022 22:12 )  x     / 0.09 ng/mL / x     / x     / x            CAPILLARY BLOOD GLUCOSE 15.8   12.31 )-----------( 245      ( 30 May 2022 22:12 )             44.3       05-30    141  |  106  |  19  ----------------------------<  109<H>  4.1   |  23  |  1.0    Ca    9.1      30 May 2022 22:12  Mg     2.2     05-30    TPro  7.4  /  Alb  4.4  /  TBili  0.3  /  DBili  x   /  AST  27  /  ALT  43<H>  /  AlkPhos  144<H>  05-30          CARDIAC MARKERS ( 30 May 2022 22:12 )  x     / 0.09 ng/mL / x     / x     / x

## 2022-05-31 NOTE — ED ADULT NURSE NOTE - BRAND OF COVID-19 VACCINATION
----- Message from Elmo Wallace MD sent at 4/4/2018  5:41 PM CDT -----  Please link lipids and CMP to patient upcoming appointment for blood work  
Orders linked to lab appt.  
Moderna dose 1, 2, and 3

## 2022-05-31 NOTE — H&P ADULT - ASSESSMENT
52-year-old male with past medical history of CAD status post 1 stent, and cervical radiculopathy presents to the ED complaining of moderate left-sided chest pain that radiates to his left arm.     #Chest pain, r/o ACS  - Trop 0.09  - s/p asa 324 and plavix in [facility]  - EKG no acute changes seen  - start DAPT  - Cardio consulted  - f/u repeat trop, if uptrending start heparin gtt  - f/u EKG am  - Echo ordered  - NPO after midnight for possible cath am    #DVT ppx: lovenox  #GI ppx:   #Diet: NPO  #Dispo: acute 52-year-old male with past medical history of CAD status post 1 stent, and cervical radiculopathy presents to the ED complaining of moderate left-sided chest pain that radiates to his left arm.     #Chest pain, r/o ACS  - Trop 0.09  - s/p asa 324 in [facility]  - EKG no acute changes seen  - start DAPT  - Cardio consulted  - f/u repeat trop, if uptrending start heparin gtt  - f/u EKG am  - Echo ordered  - NPO after midnight for possible cath am    #DVT ppx: lovenox  #GI ppx: protonix  #Diet: NPO  #Dispo: acute 52-year-old male with past medical history of CAD status post 1 stent, and cervical radiculopathy presents to the ED complaining of moderate left-sided chest pain that radiates to his left arm.     #Chest pain, r/o ACS  - Trop 0.09  - s/p asa 324 in [facility]  - EKG no acute changes seen  - start DAPT  - Cardio consulted  - f/u repeat trop, if uptrending start heparin gtt  - f/u EKG am  - Echo ordered  - NPO after midnight for possible cath am    #Cervical radiculopathy  - c/w robaxin and gabapentin  - MRI c spine ordered, patient was c/o n/t down arm and was due to get MRI OP    #DVT ppx: lovenox  #GI ppx: protonix  #Diet: NPO  #Dispo: acute

## 2022-05-31 NOTE — ED ADULT NURSE NOTE - OBJECTIVE STATEMENT
Patient presents for L sided chest pain that radiates to the arm.  Pt was brought to [facility] and treated but left [facility] to be treated here.

## 2022-05-31 NOTE — H&P ADULT - NSHPPHYSICALEXAM_GEN_ALL_CORE
GENERAL: NAD, speaks in full sentences, no signs of respiratory distress  HEAD:  Atraumatic, Normocephalic  EYES: EOMI, PERRLA, anicteric sclera  NECK: Supple, No JVD  CHEST/LUNG: Clear to auscultation bilaterally; No wheeze; No crackles; No accessory muscles used  HEART: Regular rate and rhythm; No murmurs;   ABDOMEN: Soft, Nontender, Nondistended; Bowel sounds present; No guarding  EXTREMITIES:  2+ Peripheral Pulses, No cyanosis or edema  PSYCH: AAOx3  NEUROLOGY: non-focal  SKIN: No rashes or lesions GENERAL: NAD, speaks in full sentences, no signs of respiratory distress  HEAD:  Atraumatic, Normocephalic  EYES: EOMI, PERRLA  NECK: Supple  CHEST/LUNG: Clear to auscultation bilaterally  HEART: Regular rate and rhythm; No murmurs;   ABDOMEN: Soft, Nontender, Nondistended; Bowel sounds present; No guarding  EXTREMITIES:  No cyanosis or edema  PSYCH: AAOx3  NEUROLOGY: non-focal  SKIN: No rashes or lesions GENERAL: NAD, speaks in full sentences, no signs of respiratory distress  HEAD:  Atraumatic, Normocephalic  EYES: EOMI, PERRLA  NECK: Supple  CHEST/LUNG: Clear to auscultation bilaterally  HEART: Regular rate and rhythm; No murmurs  ABDOMEN: Soft, Nontender, Nondistended, BS+  EXTREMITIES:  No cyanosis or edema  PSYCH: AAOx3  NEUROLOGY: nonfocal  SKIN: No rashes or lesions

## 2022-05-31 NOTE — PATIENT PROFILE ADULT - DO YOU LACK THE NECESSARY SUPPORT TO HELP YOU COPE WITH LIFE CHALLENGES?
[facility] AMB[facility]AT[facility] ENCOUNTER  NEUROLOGY CONSULT NOTE    REQUESTING PROVIDER:  Claude Palmer,*    CHIEF COMPLAINT:    Chief Complaint   Patient presents with   • Neurologic Problem     lacunar infarction       SUBJECTIVE:  Dre Joseph is a 89 year old right handed male who was seen today for Stroke follow up.    He was admitted to the hospital 5/7/17 and d/c 5/9/17. He was admitted due to weakness, he was unable to get up on his own.   He was admitted after being unable to walk. He has been using a walker for over a year. He has been using a walker all the time, he does not trust himself. He has a h/o frequent falls.     There was no focal weakness, face was red/flush. His speech is reported by wife to be doing ok. His balance is still affected, he is not back to normal. She thinks he is 80 % better. He is now able to get around and get in and out of bed.     No falls since he left the hospital. No episodes of vision loss. No speech problems. No hallucinations.     Drinks a beer with dinner/supper - at least once a day    HISTORIES:  Past Medical History:   Diagnosis Date   • Anxiety    • Atrial fibrillation (CMS/HCC)     post op   • AVNRT (AV trace re-entry tachycardia) (CMS/HCC)     s/p ablation-11/2010   • BPH (benign prostatic hyperplasia)    • BPH with obstruction/lower urinary tract symptoms 12/23/2011   • CAD (coronary artery disease)     c/p CABG-left mammary to LAD, vein graft to PDA of right and left distal circumflex   • Chronic constipation    • Degenerative disk disease    • Depression    • Diverticulosis    • ED (erectile dysfunction) 12/9/2014   • Erectile dysfunction    • Frequency 10/28/2014   • GERD (gastroesophageal reflux disease)    • Gout    • Hard of hearing    • History of atrial fibrillation     Post operative   • Hx of colonic polyps    • Hypercholesterolemia    • Hypertension    • Obesity    • Osteoarthritis    • Syncope      Past Surgical History:   Procedure Laterality Date    • CABG, ARTERIAL, THREE  7/13/2009    Left mammary to LAD, vein graft to PDA of right and distal circumflex   • CARDIAC CATHERIZATION  7/2/2009    3 vessel CAD-LAD, mid left circumflex and mid RCA   • CATARACT EXTRACTION, BILATERAL     • COLONOSCOPY REMOVE LESION BY SNARE  04/24/2009   • EP ABLATION W/ ANESTHESIA  11/2010    During HUT for AVNRT   • EP CARDIOVERSION (W/ANEST)  11/2010    During HUT, DCCV of Afib that was induced during Isuprel   • HB TILT TABLE TESTING  11/2010    with EP study, Positive for Vaso response, Inducible AVNRT   • HOLTER MONITOR - 48 HOUR  04/15/2013   • HOLTER MONITOR WEARABLE  10/28/2010    Min HR-57bpm, max HR-111bpm, Nonsustained Atrial tachycardia   • LAMINECTOMY,LUMBAR      L4-L5   • STRESS TEST  04/17/2013    EF 48%     ALLERGIES:   Allergen Reactions   • Flomax DIZZINESS   • Morphine Other (See Comments)     \"hard time to come out of it\"     Current Outpatient Prescriptions   Medication Sig Dispense Refill   • amLODIPine (NORVASC) 10 MG tablet Take 1 tablet by mouth daily. 90 tablet 1   • atorvastatin (LIPITOR) 10 MG tablet Take 1 tablet by mouth daily. 90 tablet 1   • Artificial Tear Ointment (DRY EYES OP) Place 1 drop into both eyes daily as needed.     • albuterol 108 (90 Base) MCG/ACT inhaler Inhale 2 puffs into the lungs 4 times daily as needed for Shortness of Breath or Wheezing. 3 Inhaler 3   • finasteride (PROSCAR) 5 MG tablet Take 1 tablet by mouth daily. (Patient taking differently: Take 5 mg by mouth nightly. ) 90 tablet 0   • metoPROLOL (TOPROL-XL) 25 MG 24 hr tablet Take 0.5 tablets by mouth daily. 90 tablet 3   • omeprazole (PRILOSEC) 20 MG capsule TAKE 1 CAPSULE BY MOUTH DAILY 90 capsule 3   • Spacer/Aero-Holding Chambers (AEROCHAMBER) To be used with inhlaer 1 each 0   • acetaminophen (TYLENOL) 325 MG tablet Take 650 mg by mouth every 4 hours as needed.     • aspirin EC (ECOTRIN) 81 MG EC tablet Take 81 mg by mouth daily.         No current facility-administered  medications for this visit.      Family History   Problem Relation Age of Onset   • Heart attack Father    • Cancer Father    • Heart disease Father    • Kidney disease Mother    • Heart disease Son    • NEGATIVE FAMILY HX OF Other      neg    • No CAD Neg Hx    • No Sudden Cardiac Arrest Neg Hx      Social History     Social History   • Marital status:      Spouse name: rosa   • Number of children: 4   • Years of education: N/A     Social History Main Topics   • Smoking status: Former Smoker     Packs/day: 1.00     Types: Cigarettes     Quit date: 1/1/1960   • Smokeless tobacco: Former User     Types: Chew     Quit date: 1/1/2007   • Alcohol use 7.2 - 12.0 oz/week     12 - 20 Standard drinks or equivalent per week   • Drug use: No   • Sexual activity: No     Other Topics Concern   • None     Social History Narrative       REVIEW OF SYSTEMS:  Neurology: See History of Present Illness.     Itchiness in legs         General: Pos. Neg. HEENT: Pos. Neg.        Fever  []  [x]       Vision Changes  []  [x]        Chills  []  [x]      Upper respiratory symptoms  []  [x]        Weight Changes  []  [x]      Cardiovascular:   Respiratory:          Chest pain  []  [x]       Short of Breath  []  [x]        Palpitations  []  [x]       Cough  []  [x]   GI:    Genitorurinary:          Constipation  []  [x]        Incontinence  []  [x]        Diarrhea  []  [x]      Skin:     Hematology:           Rash  []  [x]       Easy bleeding  []  [x]         Lesions  []  [x]      Easy bruising  []  [x]   Psychiatric:   Musculoskeletal:          Anxiety  []  [x]      Joint pain  []  [x]         Depression  []  [x]          PHYSICAL EXAM:  Visit Vitals   • /68   • Pulse 83   • SpO2 95%     GENERAL APPEARANCE: Patient is pleasant, cooperative in no apparent distress.  HEART: Regular rhythm with no murmurs.  NECK: Carotids no bruits.  No meningismus.  EXTREMITIES: Extremities are warm and well perfused. Radial and pedal pulses are  intact.    NEUROLOGIC:  Mentation: Alert orientated and cooperative. Affect is appropriate. No evidence of cognitive impairment. General fund of knowledge good. Short and long-term memory within normal range for age as manifested by the ability to provide an accurate history of his illness and past evaluations. Language function is intact without aphasia errors.    Cranial nerves:  CN I: Not tested.    CN II: Visual acuity grossly intact. Visual fields full to confrontational testing.    CN III, IV, VI: Pupils are equal, reactive to light and accommodation. Extraocular movements full, without nystagmus or gaze paresis. There is no ptosis.    CN V: Facial sensation intact to light touch.    CN VII: Facial movement full and symmetric.    CN VIII: Hearing intact to a speaking voice.    CN IX, X: Palate elevates in the midline, swallow and phonation are normal.    CN XI: Sternocleidomastoid are symmetrical with normal movement.    CN XII: Tongue is in the midline without atrophy or fasciculations.    Motor: No abnormal movements noticed. Muscle mass, tone and strength are intact over all extremities. No atrophy or fasciculations noted.    Sensory: Sensation to pinprick, light touch, and cold temperature is intact and symmetric in all extremities. Vibration sensation is absent up to the hips.     Reflexes: The reflexes are 1/4 for biceps, triceps, brachioradialis, 0/4 patellar and achillies tendon bilaterally and symmetrically.     Cerebellar: Rapid hand movement is normal. Finger to nose is intact bilaterally without dysmetria or tremor.  Heel to shin is intact bilaterally without dysmetria.  Fine finger movements are normal.    Gait: Gait was abnormal. There is a sensory ataxia, walks with a walker.     LABORATORY DATA:  Component      Latest Ref Rng & Units 5/2/2017   CHOLESTEROL      <200 mg/dL 205 (H)   CALCULATED LDL      <130 mg/dL 136 (H)   HDL      >59 mg/dL 28 (L)   TRIGLYCERIDE      <150 mg/dL 205 (H)    CALCULATED NON HDL      mg/dL 177   CHOL/HDL      <4.5 7.3 (H)       IMAGING STUDIES:  Results for orders placed during the hospital encounter of 05/07/17   CT Head Brain    Impression IMPRESSION:     No acute intracranial process. Extensive microvascular disease and multiple  lacunar infarcts. MRI is more sensitive for acute infarct.         Results for orders placed during the hospital encounter of 05/07/17   US CAROTID BILATERAL    Impression IMPRESSION:      1.  Scattered plaque is seen in both common carotid arteries and proximal  internal carotid arteries without evidence of a significant stenosis  similar findings were present on the previous exam.  2.  Antegrade flow structures left vertebral artery. We could not visualize  the right vertebral artery. On the previous ultrasound examination flow was  demonstrated in the right vertebral artery. CTA or MRA could be obtained  for further evaluation      //Location Code: [facility]           ASSESSMENT:  1. Sensory ataxia    2. Falls frequently    3. Leg weakness, bilateral    4. Idiopathic progressive neuropathy      Patient is seen for evaluation of generalized weakness. He was admitted to the hospital May 7, 2017 due to having falling being unable to get up on his own. Patient comes with wife who states that he has required to ambulate with a walker for over a year. He had been reluctant to use a walker but since his admission he has been using the walker all the time. He has a history of frequent falls but none since he started using the walker.    He has been doing quite better since he was discharged she feels that he is 80% better and with the walker he is able to get around in bed better. They report no falls since he left the hospital.    The history does not provide clues into a potential etiology to the patient's inability to walk. It does not appear to be that the patient's weakness was focal, he reports generalized inability to ambulate and worsening  balance. His neurological exam is remarkable for absent vibration is up to the hips with intact pinprick and temperature. His reflexes are unobtainable in lower extremities. He basically has a sensory ataxia.    We recommended for the patient to undergo blood test to assess for potential etiologies to the weakness. We did discuss with them the possibility of an EMG, this will be to demonstrate the presence of neuropathy. A polyneuropathy will be able to explain a sensory ataxia.    Patient and wife appear to understand. He will like to minimize interventions. This is understandable. We will start with blood tests and proceed depending results.    PLAN:      Orders Placed This Encounter   • Vitamin B6 Plasma   • Vitamin B12 Level   • Vitamin B1 Whole Blood   • Treponema Pallidum Antibody IgG   • Thyroid Stimulating Hormone Reflex   • Sedimentation Rate Westergren   • Lyme IgG & IgM AB Screen   • Folate Level   • MARI Screen with AB and IFA Reflex       There are no Patient Instructions on file for this visit.    Instructions provided as documented in the After Visit Summary.    The patient and/or family members indicated understanding of the diagnosis and agreed with the plan of care.    Total time spent with patient 45 minutes. Coordination of care counseling and education 35 minutes.    Claude Ferreira MD     no

## 2022-05-31 NOTE — H&P ADULT - ATTENDING COMMENTS
53 yo M with CAD s/p PCI x 1 (~2014), current smoker presents with NSTEMI/UA.  Has not followed up with a cardiologist in years.  Does not take aspirin regularly.    COVID negative on 9/30  Trop  0.86  CKMB  195  AST  126  ALT  54    Plan:  - Continue aspirin, Plavix, Heparin gtt  - Start statin tomorrow if AST/ALT is trending down  - Cardiac cath tomorrow morning at 7 AM - Dr. Sherman  - NPO after midnight tonight  - Echo today

## 2022-05-31 NOTE — H&P ADULT - HISTORY OF PRESENT ILLNESS
52-year-old male with past medical history of CAD status post 1 stent, and cervical radiculopathy presents to the ED complaining of moderate left-sided chest pain that radiates to his left arm.  Pain was pressure-like, constant, associated with elevated BP.  EMS brought patient to [facility], was given 1 sublingual nitro and 324 mg ASA with improvement in symptoms.  Patient did not stay to be evaluated at [facility] and left to get care here.  Patient has not followed up with a cardiologist for several years.  Patient only taking baby aspirin occasionally now, along with gabapentin and methocarbamol for neck pain.  Patient states pain has improved greatly since onset and now he feels slight pinching in the left side of his chest.  He denies other complaints. Pt denies fever, chills, nausea, vomiting, abdominal pain, diarrhea, headache, dizziness, weakness, SOB, back pain, LOC, trauma, urinary symptoms, cough, calf pain/swelling, recent travel, recent surgery.    In the ED, EKG shows q waves on septal leads but not acute changes. Labs are remarkable WBC ct 12K and trop 0.09.     Vital Signs Last 24 Hrs  T(C): 36.7 (30 May 2022 20:41), Max: 36.7 (30 May 2022 20:41)  T(F): 98 (30 May 2022 20:41), Max: 98 (30 May 2022 20:41)  HR: 82 (30 May 2022 20:41) (82 - 82)  BP: 197/94 (30 May 2022 20:41) (197/94 - 197/94)  BP(mean): --  RR: 18 (30 May 2022 20:41) (18 - 18)  SpO2: 100% (30 May 2022 20:41) (100% - 100%)

## 2022-05-31 NOTE — ED ADULT NURSE NOTE - NS ED NURSE REPORT GIVEN DT
Patient to [facility] room 405 via WC from ER for fetal and uterine monitoring. Dr. More aware of patients status.  Settled in bed, EFM/EUM applied. FHT's 130's, no uterine activity noted. Will continue to monitor.   31-May-2022 00:44

## 2022-05-31 NOTE — ED ADULT NURSE NOTE - CHIEF COMPLAINT QUOTE
Pt presents to the ED with c/o of high BP and chest pain. Pt states his BP at home was 220/119. Pt called EMS and was sent to [facility]. En route to [facility] pt was given 1 tab of Nitro sublingual and aspirin 243mg aspirin. Pt didn't like [facility] left to come here. Pt endorses his chest pain has improved but still feels "slight pinch". Pt h/x of 1 cardiac stent. EKG done in triage

## 2022-05-31 NOTE — PATIENT PROFILE ADULT - FALL HARM RISK - FACTORS NURSING JUDGEMENT
No Abdomen soft, non-tender and non-distended, no rebound, no guarding and no masses. no hepatosplenomegaly.

## 2022-06-01 LAB
ALBUMIN SERPL ELPH-MCNC: 4.1 G/DL — SIGNIFICANT CHANGE UP (ref 3.5–5.2)
ALP SERPL-CCNC: 135 U/L — HIGH (ref 30–115)
ALT FLD-CCNC: 64 U/L — HIGH (ref 0–41)
ANION GAP SERPL CALC-SCNC: 14 MMOL/L — SIGNIFICANT CHANGE UP (ref 7–14)
APTT BLD: 49.9 SEC — HIGH (ref 27–39.2)
AST SERPL-CCNC: 143 U/L — HIGH (ref 0–41)
BASOPHILS # BLD AUTO: 0.05 K/UL — SIGNIFICANT CHANGE UP (ref 0–0.2)
BASOPHILS NFR BLD AUTO: 0.4 % — SIGNIFICANT CHANGE UP (ref 0–1)
BILIRUB SERPL-MCNC: 0.6 MG/DL — SIGNIFICANT CHANGE UP (ref 0.2–1.2)
BUN SERPL-MCNC: 13 MG/DL — SIGNIFICANT CHANGE UP (ref 10–20)
CALCIUM SERPL-MCNC: 9 MG/DL — SIGNIFICANT CHANGE UP (ref 8.5–10.1)
CHLORIDE SERPL-SCNC: 102 MMOL/L — SIGNIFICANT CHANGE UP (ref 98–110)
CO2 SERPL-SCNC: 20 MMOL/L — SIGNIFICANT CHANGE UP (ref 17–32)
CREAT SERPL-MCNC: 0.9 MG/DL — SIGNIFICANT CHANGE UP (ref 0.7–1.5)
EGFR: 103 ML/MIN/1.73M2 — SIGNIFICANT CHANGE UP
EOSINOPHIL # BLD AUTO: 0.05 K/UL — SIGNIFICANT CHANGE UP (ref 0–0.7)
EOSINOPHIL NFR BLD AUTO: 0.4 % — SIGNIFICANT CHANGE UP (ref 0–8)
GLUCOSE SERPL-MCNC: 119 MG/DL — HIGH (ref 70–99)
HCT VFR BLD CALC: 45.5 % — SIGNIFICANT CHANGE UP (ref 42–52)
HGB BLD-MCNC: 16.1 G/DL — SIGNIFICANT CHANGE UP (ref 14–18)
IMM GRANULOCYTES NFR BLD AUTO: 0.4 % — HIGH (ref 0.1–0.3)
LYMPHOCYTES # BLD AUTO: 2.9 K/UL — SIGNIFICANT CHANGE UP (ref 1.2–3.4)
LYMPHOCYTES # BLD AUTO: 20.5 % — SIGNIFICANT CHANGE UP (ref 20.5–51.1)
MAGNESIUM SERPL-MCNC: 2.2 MG/DL — SIGNIFICANT CHANGE UP (ref 1.8–2.4)
MCHC RBC-ENTMCNC: 31 PG — SIGNIFICANT CHANGE UP (ref 27–31)
MCHC RBC-ENTMCNC: 35.4 G/DL — SIGNIFICANT CHANGE UP (ref 32–37)
MCV RBC AUTO: 87.7 FL — SIGNIFICANT CHANGE UP (ref 80–94)
MONOCYTES # BLD AUTO: 1.52 K/UL — HIGH (ref 0.1–0.6)
MONOCYTES NFR BLD AUTO: 10.7 % — HIGH (ref 1.7–9.3)
NEUTROPHILS # BLD AUTO: 9.56 K/UL — HIGH (ref 1.4–6.5)
NEUTROPHILS NFR BLD AUTO: 67.6 % — SIGNIFICANT CHANGE UP (ref 42.2–75.2)
NRBC # BLD: 0 /100 WBCS — SIGNIFICANT CHANGE UP (ref 0–0)
PLATELET # BLD AUTO: 258 K/UL — SIGNIFICANT CHANGE UP (ref 130–400)
POTASSIUM SERPL-MCNC: 4.2 MMOL/L — SIGNIFICANT CHANGE UP (ref 3.5–5)
POTASSIUM SERPL-SCNC: 4.2 MMOL/L — SIGNIFICANT CHANGE UP (ref 3.5–5)
PROT SERPL-MCNC: 7.3 G/DL — SIGNIFICANT CHANGE UP (ref 6–8)
RBC # BLD: 5.19 M/UL — SIGNIFICANT CHANGE UP (ref 4.7–6.1)
RBC # FLD: 12.5 % — SIGNIFICANT CHANGE UP (ref 11.5–14.5)
SODIUM SERPL-SCNC: 136 MMOL/L — SIGNIFICANT CHANGE UP (ref 135–146)
T4 FREE+ TSH PNL SERPL: 1.45 UIU/ML — SIGNIFICANT CHANGE UP (ref 0.27–4.2)
WBC # BLD: 14.14 K/UL — HIGH (ref 4.8–10.8)
WBC # FLD AUTO: 14.14 K/UL — HIGH (ref 4.8–10.8)

## 2022-06-01 PROCEDURE — 72141 MRI NECK SPINE W/O DYE: CPT | Mod: 26

## 2022-06-01 PROCEDURE — 92978 ENDOLUMINL IVUS OCT C 1ST: CPT | Mod: 26,LD

## 2022-06-01 PROCEDURE — 99232 SBSQ HOSP IP/OBS MODERATE 35: CPT

## 2022-06-01 PROCEDURE — 93458 L HRT ARTERY/VENTRICLE ANGIO: CPT | Mod: 26,XU

## 2022-06-01 PROCEDURE — 92928 PRQ TCAT PLMT NTRAC ST 1 LES: CPT | Mod: LD

## 2022-06-01 RX ORDER — TICAGRELOR 90 MG/1
1 TABLET ORAL
Qty: 60 | Refills: 3
Start: 2022-06-01 | End: 2022-09-28

## 2022-06-01 RX ORDER — PRASUGREL 5 MG/1
1 TABLET, FILM COATED ORAL
Qty: 30 | Refills: 3
Start: 2022-06-01 | End: 2022-09-28

## 2022-06-01 RX ORDER — PRASUGREL 5 MG/1
30 TABLET, FILM COATED ORAL ONCE
Refills: 0 | Status: COMPLETED | OUTPATIENT
Start: 2022-06-01 | End: 2022-06-01

## 2022-06-01 RX ORDER — MORPHINE SULFATE 50 MG/1
2 CAPSULE, EXTENDED RELEASE ORAL ONCE
Refills: 0 | Status: DISCONTINUED | OUTPATIENT
Start: 2022-06-01 | End: 2022-06-01

## 2022-06-01 RX ORDER — PRASUGREL 5 MG/1
10 TABLET, FILM COATED ORAL DAILY
Refills: 0 | Status: DISCONTINUED | OUTPATIENT
Start: 2022-06-02 | End: 2022-06-02

## 2022-06-01 RX ORDER — SODIUM CHLORIDE 9 MG/ML
1000 INJECTION INTRAMUSCULAR; INTRAVENOUS; SUBCUTANEOUS
Refills: 0 | Status: DISCONTINUED | OUTPATIENT
Start: 2022-06-01 | End: 2022-06-02

## 2022-06-01 RX ADMIN — PANTOPRAZOLE SODIUM 40 MILLIGRAM(S): 20 TABLET, DELAYED RELEASE ORAL at 06:06

## 2022-06-01 RX ADMIN — METHOCARBAMOL 750 MILLIGRAM(S): 500 TABLET, FILM COATED ORAL at 17:18

## 2022-06-01 RX ADMIN — Medication 650 MILLIGRAM(S): at 13:15

## 2022-06-01 RX ADMIN — GABAPENTIN 200 MILLIGRAM(S): 400 CAPSULE ORAL at 21:36

## 2022-06-01 RX ADMIN — Medication 12.5 MILLIGRAM(S): at 17:17

## 2022-06-01 RX ADMIN — SODIUM CHLORIDE 150 MILLILITER(S): 9 INJECTION INTRAMUSCULAR; INTRAVENOUS; SUBCUTANEOUS at 10:02

## 2022-06-01 RX ADMIN — PRASUGREL 30 MILLIGRAM(S): 5 TABLET, FILM COATED ORAL at 10:27

## 2022-06-01 RX ADMIN — Medication 12.5 MILLIGRAM(S): at 06:06

## 2022-06-01 RX ADMIN — METHOCARBAMOL 750 MILLIGRAM(S): 500 TABLET, FILM COATED ORAL at 06:06

## 2022-06-01 RX ADMIN — GABAPENTIN 100 MILLIGRAM(S): 400 CAPSULE ORAL at 10:27

## 2022-06-01 RX ADMIN — HEPARIN SODIUM 1400 UNIT(S)/HR: 5000 INJECTION INTRAVENOUS; SUBCUTANEOUS at 01:07

## 2022-06-01 RX ADMIN — MORPHINE SULFATE 2 MILLIGRAM(S): 50 CAPSULE, EXTENDED RELEASE ORAL at 20:46

## 2022-06-01 RX ADMIN — Medication 650 MILLIGRAM(S): at 04:35

## 2022-06-01 RX ADMIN — CHLORHEXIDINE GLUCONATE 1 APPLICATION(S): 213 SOLUTION TOPICAL at 06:07

## 2022-06-01 RX ADMIN — METHOCARBAMOL 750 MILLIGRAM(S): 500 TABLET, FILM COATED ORAL at 10:27

## 2022-06-01 RX ADMIN — Medication 650 MILLIGRAM(S): at 12:44

## 2022-06-01 RX ADMIN — CLOPIDOGREL BISULFATE 75 MILLIGRAM(S): 75 TABLET, FILM COATED ORAL at 06:43

## 2022-06-01 RX ADMIN — MORPHINE SULFATE 2 MILLIGRAM(S): 50 CAPSULE, EXTENDED RELEASE ORAL at 21:35

## 2022-06-01 RX ADMIN — Medication 81 MILLIGRAM(S): at 06:42

## 2022-06-01 NOTE — PROGRESS NOTE ADULT - SUBJECTIVE AND OBJECTIVE BOX
************************************************  Gutierrez Olvera MD (PGY-1)  Spectra: [phone]  ************************************************    SUBJECTIVE / OVERNIGHT EVENTS  Unable to interview as patient off unit for [facility]. Will return to interview once back on unit. Otherwise, no event noted on telemetry. No acute overnight events reported by clinical staff.    MEDICATIONS  aspirin enteric coated 81 milliGRAM(s) Oral daily  chlorhexidine 4% Liquid 1 Application(s) Topical <User Schedule>  clopidogrel Tablet 75 milliGRAM(s) Oral daily  gabapentin 100 milliGRAM(s) Oral daily  gabapentin Oral Tab/Cap - Peds 200 milliGRAM(s) Oral at bedtime  heparin   Injectable 5000 Unit(s) IV Push once  heparin  Infusion.  Unit(s)/Hr IV Continuous <Continuous>  methocarbamol 750 milliGRAM(s) Oral every 6 hours  metoprolol tartrate 12.5 milliGRAM(s) Oral two times a day  pantoprazole    Tablet 40 milliGRAM(s) Oral before breakfast    acetaminophen     Tablet .. 650 milliGRAM(s) Oral every 6 hours PRN Temp greater or equal to 38C (100.4F), Mild Pain (1 - 3)  aluminum hydroxide/magnesium hydroxide/simethicone Suspension 30 milliLiter(s) Oral every 4 hours PRN Dyspepsia  heparin   Injectable 5600 Unit(s) IV Push every 6 hours PRN For aPTT less than 40    VITALS /  EXAM    T(C): 38.1 (06-01-22 @ 04:57), Max: 38.1 (06-01-22 @ 04:57)  HR: 95 (06-01-22 @ 04:57) (76 - 95)  BP: 146/68 (06-01-22 @ 04:57) (129/66 - 165/92)  RR: 18 (06-01-22 @ 04:57) (18 - 18)  SpO2: --    GENERAL: NAD, well-developed  CHEST/LUNG: Clear to auscultation bilaterally; No wheezes, rales or rhonchi  HEART: Regular rate and rhythm; No murmurs, rubs, or gallops  ABDOMEN: Soft, Nontender, Nondistended; Bowel sounds present, no masses.  EXTREMITIES:  2+ Peripheral Pulses, No clubbing, cyanosis, or edema    I's & O's     05-31-22 @ 07:01  -  06-01-22 @ 07:00  --------------------------------------------------------  IN:    Heparin Infusion: 284 mL    Oral Fluid: 220 mL  Total IN: 504 mL    OUT:    Voided (mL): 300 mL  Total OUT: 300 mL    Total NET: 204 mL    LABS             16.1   14.14 )-----------( 258      ( 06-01-22 @ 06:19 )             45.5     136  |  102  |  13  -------------------------<  119   06-01-22 @ 06:19  4.2  |  20  |  0.9    Ca      9.0     06-01-22 @ 06:19  Mg     2.2     06-01-22 @ 06:19    TPro  7.3  /  Alb  4.1  /  TBili  0.6  /  DBili  x   /  AST  143  /  ALT  64  /  AlkPhos  135  /  GGT  x     06-01-22 @ 06:19    PT/INR - ( 05-31-22 @ 06:25 )   PT: 11.00 sec;   INR: 0.96 ratio  PTT - ( 06-01-22 @ 00:29 )  PTT:49.9 sec    Troponin T, Serum: 0.86 ng/mL (05-31-22 @ 06:25)  Troponin T, Serum: 0.44 ng/mL (05-31-22 @ 02:03)  Troponin T, Serum: 0.09 ng/mL (05-30-22 @ 22:12)    CKMB Units: 194.8 ng/mL (05-31-22 @ 06:25)  CKMB Units: 99.5 ng/mL (05-31-22 @ 02:03)    Serum Pro-Brain Natriuretic Peptide: 5 pg/mL (05-30-22 @ 22:12)    MICRO / IMAGING / CARDIOLOGY  Telemetry: Reviewed   EKG: Reviewed

## 2022-06-01 NOTE — PROGRESS NOTE ADULT - ASSESSMENT
52M w/ h/o CAD (s/p PCI x1) p/w chest pain and admitted for NSTEMI. Pending [facility].    #NSTEMI  #Coronary Artery Disease  Initially p/w atypical chest pain (2/3 criteria). No acute ischemic changes on admission EKG. Tn 0.09->0.44->0.86 thereby ruling in NSTEMI. A1c 5.7%, .  - c/w ASA 81mg PO QD  - c/w Plavix 75mg PO QD  - c/w heparin gtt (ACS protocol)  - continue to hold statin in setting of acute transaminitis  - c/w Lopressor 12.5mg PO BID  - f/u TSH  - [facility] today; f/u post-cath report    #Acute Transaminitis  Liver enzymes elevated day after admission. AST//54.  - trend AST/ALT via daily CMP (AST//64 on 6/1 AM labs)    #Cervical Radiculopathy  Known h/o cervical radiculopathy involving numbness/tingling in R arm. Scheduled for MR C-spine outpatient.   - c/w Robaxin 750mg PO QID  - c/w gabapentin 100mg PO QAM  - c/w gabapentin 200mg PO QHS  - obtain MRI C-Spine w/ w/o IV contrast if still inpatient    DVT PPX: heparin gtt  GI PPX: pantoprazole 40mg PO QD  DIET: DASH/TLC; NPO since MN  ACTIVITY: IAT  DISPOSITION: From Home    PENDING: [facility] today

## 2022-06-01 NOTE — PROGRESS NOTE ADULT - ATTENDING COMMENTS
51 yo M with CAD s/p PCI x 1 (~2014), current smoker presents with NSTEMI/UA.  Has not followed up with a cardiologist in years.  Does not take aspirin regularly.    - Cardiac cath today  - Continue aspirin, Plavix, Heparin gtt  - Start statin tomorrow if AST/ALT is trending down

## 2022-06-01 NOTE — CHART NOTE - NSCHARTNOTEFT_GEN_A_CORE
Patient pharmacy called in for Effient 10 mg PO Daily, co-pay is $1 per month, medication ready for a , patient is agreeing for it

## 2022-06-01 NOTE — CHART NOTE - NSCHARTNOTEFT_GEN_A_CORE
PRE-OP DIAGNOSIS:    NSTEMI    PROCEDURE: Coronary angiogram, [facility], PCI    Attending:  Dr. Sherman     Interventional Fellow:  Dr. Pinedo       Consent:      [x] Patient     [] Family Member     []  Used        Anesthesia:     [] General     [x] Sedation     [x] Local        Access & Closure:     [x] 6Fr Radial Artery -> D stat    IV Contrast:     mL        Intervention:   PTCA, IVUS, cutting balloon, OTILIA x 1 to D1    Implants:   2.75 Synergy stent to D1      FINDINGS:       Hemodynamics: Hemodynamic assessment demonstrates normal LVEDP.     Coronary circulation: The coronary circulation is co-dominant. There was 3-vessel coronary artery disease (LAD, RCA, and circumflex).     Left main: Angiography showed minor luminal irregularities with no flow limiting lesions.     LAD: Angiography showed mild diffuse atherosclerosis.   1st diagonal: The vessel was large sized. There was a tubular 100 % stenosis at the ostium of the vessel segment, in-stent. There was ALKE grade 0 flow through the vessel (no flow) (the distal vessel was supplied by faint collaterals). This is a likely culprit for the patient's clinical presentation. An intervention was performed.     Circumflex: The vessel was medium to large sized. Proximal circumflex: Angiography showed no evidence of disease. Distal circumflex: There was a discrete 60- 70 % stenosis.   1st obtuse marginal: The vessel was medium to large sized. Angiography showed mild atherosclerosis with no flow limiting lesions.   Left posterior descending artery: Angiography showed mild atherosclerosis with no flow limiting lesions.     RCA: The vessel was medium sized (co-dominant). There was a discrete 90% stenosis in mid RCA.   Right PDA: Angiography showed mild atherosclerosis with no flow limiting lesions.        ESTIMATED BLOOD LOSS: < 30 mL        CONDITION:     [x] Good     [] Fair     [] Critical        SPECIMEN REMOVED: N/A       POST-OP DIAGNOSIS:    There is significant double vessel coronary artery disease.  Stent thrombosis of D1 s/p successful PCI (AUC score 8).        PLAN OF CARE:   tele monitoring   IV NS  150 cc/hr x 6 hrs   Medical therapy including DAPT and risk factor modification  Staged PCI of mid RCA in 1-2 days.  Distal LCx lesion will be reevaluated as outpatient if clinically warranted. PRE-OP DIAGNOSIS:    NSTEMI    PROCEDURE: Coronary angiogram, [facility], PCI    Attending:  Dr. Sherman     Interventional Fellow:  Dr. Pinedo       Consent:      [x] Patient     [] Family Member     []  Used        Anesthesia:     [] General     [x] Sedation     [x] Local        Access & Closure:     [x] 6Fr Radial Artery -> D stat    IV Contrast:     mL        Intervention:   PTCA, IVUS, cutting balloon, OTILIA x 1 to D1    Implants:   2.75 Synergy stent to D1      FINDINGS:       Hemodynamics: Hemodynamic assessment demonstrates normal LVEDP.     Coronary circulation: The coronary circulation is co-dominant. There was 3-vessel coronary artery disease (LAD, RCA, and circumflex).     Left main: Angiography showed minor luminal irregularities with no flow limiting lesions.     LAD: Angiography showed mild diffuse atherosclerosis.   1st diagonal: The vessel was large sized. There was a tubular 100 % stenosis at the ostium of the vessel segment, in-stent. There was LAKE grade 0 flow through the vessel (no flow) (the distal vessel was supplied by faint collaterals). This is a likely culprit for the patient's clinical presentation. An intervention was performed.     Circumflex: The vessel was medium to large sized. Proximal circumflex: Angiography showed no evidence of disease. Distal circumflex: There was a discrete 60- 70 % stenosis.   1st obtuse marginal: The vessel was medium to large sized. Angiography showed mild atherosclerosis with no flow limiting lesions.   Left posterior descending artery: Angiography showed mild atherosclerosis with no flow limiting lesions.     RCA: The vessel was medium sized (co-dominant). There was a discrete 90% stenosis in mid RCA.   Right PDA: Angiography showed mild atherosclerosis with no flow limiting lesions.        ESTIMATED BLOOD LOSS: < 30 mL        CONDITION:     [x] Good     [] Fair     [] Critical        SPECIMEN REMOVED: N/A       POST-OP DIAGNOSIS:    There is significant double vessel coronary artery disease.  Stent thrombosis of D1 s/p successful PCI (AUC score 8).        PLAN OF CARE:   tele monitoring   IV NS  150 cc/hr x 6 hrs   Medical therapy including DAPT and risk factor modification  Staged PCI of mid RCA during index hx  Distal LCx lesion will be reevaluated as outpatient if clinically warranted.

## 2022-06-02 ENCOUNTER — TRANSCRIPTION ENCOUNTER (OUTPATIENT)
Age: 52
End: 2022-06-02

## 2022-06-02 VITALS
RESPIRATION RATE: 18 BRPM | TEMPERATURE: 99 F | HEART RATE: 74 BPM | DIASTOLIC BLOOD PRESSURE: 72 MMHG | SYSTOLIC BLOOD PRESSURE: 126 MMHG

## 2022-06-02 LAB
ALBUMIN SERPL ELPH-MCNC: 4 G/DL — SIGNIFICANT CHANGE UP (ref 3.5–5.2)
ALP SERPL-CCNC: 118 U/L — HIGH (ref 30–115)
ALT FLD-CCNC: 48 U/L — HIGH (ref 0–41)
ANION GAP SERPL CALC-SCNC: 13 MMOL/L — SIGNIFICANT CHANGE UP (ref 7–14)
AST SERPL-CCNC: 87 U/L — HIGH (ref 0–41)
BASOPHILS # BLD AUTO: 0.05 K/UL — SIGNIFICANT CHANGE UP (ref 0–0.2)
BASOPHILS NFR BLD AUTO: 0.4 % — SIGNIFICANT CHANGE UP (ref 0–1)
BILIRUB SERPL-MCNC: 0.8 MG/DL — SIGNIFICANT CHANGE UP (ref 0.2–1.2)
BUN SERPL-MCNC: 14 MG/DL — SIGNIFICANT CHANGE UP (ref 10–20)
CALCIUM SERPL-MCNC: 8.7 MG/DL — SIGNIFICANT CHANGE UP (ref 8.5–10.1)
CHLORIDE SERPL-SCNC: 103 MMOL/L — SIGNIFICANT CHANGE UP (ref 98–110)
CO2 SERPL-SCNC: 22 MMOL/L — SIGNIFICANT CHANGE UP (ref 17–32)
CREAT SERPL-MCNC: 0.8 MG/DL — SIGNIFICANT CHANGE UP (ref 0.7–1.5)
EGFR: 107 ML/MIN/1.73M2 — SIGNIFICANT CHANGE UP
EOSINOPHIL # BLD AUTO: 0.06 K/UL — SIGNIFICANT CHANGE UP (ref 0–0.7)
EOSINOPHIL NFR BLD AUTO: 0.5 % — SIGNIFICANT CHANGE UP (ref 0–8)
GLUCOSE SERPL-MCNC: 97 MG/DL — SIGNIFICANT CHANGE UP (ref 70–99)
HCT VFR BLD CALC: 41.4 % — LOW (ref 42–52)
HGB BLD-MCNC: 14.5 G/DL — SIGNIFICANT CHANGE UP (ref 14–18)
IMM GRANULOCYTES NFR BLD AUTO: 0.5 % — HIGH (ref 0.1–0.3)
LYMPHOCYTES # BLD AUTO: 2.86 K/UL — SIGNIFICANT CHANGE UP (ref 1.2–3.4)
LYMPHOCYTES # BLD AUTO: 24.3 % — SIGNIFICANT CHANGE UP (ref 20.5–51.1)
MCHC RBC-ENTMCNC: 31.3 PG — HIGH (ref 27–31)
MCHC RBC-ENTMCNC: 35 G/DL — SIGNIFICANT CHANGE UP (ref 32–37)
MCV RBC AUTO: 89.4 FL — SIGNIFICANT CHANGE UP (ref 80–94)
MONOCYTES # BLD AUTO: 1.26 K/UL — HIGH (ref 0.1–0.6)
MONOCYTES NFR BLD AUTO: 10.7 % — HIGH (ref 1.7–9.3)
NEUTROPHILS # BLD AUTO: 7.46 K/UL — HIGH (ref 1.4–6.5)
NEUTROPHILS NFR BLD AUTO: 63.6 % — SIGNIFICANT CHANGE UP (ref 42.2–75.2)
NRBC # BLD: 0 /100 WBCS — SIGNIFICANT CHANGE UP (ref 0–0)
PLATELET # BLD AUTO: 231 K/UL — SIGNIFICANT CHANGE UP (ref 130–400)
POTASSIUM SERPL-MCNC: 4.3 MMOL/L — SIGNIFICANT CHANGE UP (ref 3.5–5)
POTASSIUM SERPL-SCNC: 4.3 MMOL/L — SIGNIFICANT CHANGE UP (ref 3.5–5)
PROT SERPL-MCNC: 6.6 G/DL — SIGNIFICANT CHANGE UP (ref 6–8)
RBC # BLD: 4.63 M/UL — LOW (ref 4.7–6.1)
RBC # FLD: 12.7 % — SIGNIFICANT CHANGE UP (ref 11.5–14.5)
SODIUM SERPL-SCNC: 138 MMOL/L — SIGNIFICANT CHANGE UP (ref 135–146)
WBC # BLD: 11.75 K/UL — HIGH (ref 4.8–10.8)
WBC # FLD AUTO: 11.75 K/UL — HIGH (ref 4.8–10.8)

## 2022-06-02 PROCEDURE — 99232 SBSQ HOSP IP/OBS MODERATE 35: CPT

## 2022-06-02 PROCEDURE — 92928 PRQ TCAT PLMT NTRAC ST 1 LES: CPT | Mod: RC

## 2022-06-02 PROCEDURE — 93010 ELECTROCARDIOGRAM REPORT: CPT

## 2022-06-02 RX ORDER — METOPROLOL TARTRATE 50 MG
1 TABLET ORAL
Qty: 30 | Refills: 1
Start: 2022-06-02 | End: 2022-07-31

## 2022-06-02 RX ORDER — SODIUM CHLORIDE 9 MG/ML
1000 INJECTION INTRAMUSCULAR; INTRAVENOUS; SUBCUTANEOUS
Refills: 0 | Status: DISCONTINUED | OUTPATIENT
Start: 2022-06-02 | End: 2022-06-02

## 2022-06-02 RX ORDER — ROSUVASTATIN CALCIUM 5 MG/1
1 TABLET ORAL
Qty: 30 | Refills: 3
Start: 2022-06-02 | End: 2022-09-29

## 2022-06-02 RX ORDER — ATORVASTATIN CALCIUM 80 MG/1
80 TABLET, FILM COATED ORAL AT BEDTIME
Refills: 0 | Status: DISCONTINUED | OUTPATIENT
Start: 2022-06-02 | End: 2022-06-02

## 2022-06-02 RX ORDER — ASPIRIN/CALCIUM CARB/MAGNESIUM 324 MG
1 TABLET ORAL
Qty: 30 | Refills: 3
Start: 2022-06-02 | End: 2022-09-29

## 2022-06-02 RX ORDER — ROSUVASTATIN CALCIUM 5 MG/1
1 TABLET ORAL
Qty: 30 | Refills: 1
Start: 2022-06-02 | End: 2022-07-31

## 2022-06-02 RX ORDER — METOPROLOL TARTRATE 50 MG
1 TABLET ORAL
Qty: 30 | Refills: 3
Start: 2022-06-02 | End: 2022-09-29

## 2022-06-02 RX ORDER — ASPIRIN/CALCIUM CARB/MAGNESIUM 324 MG
1 TABLET ORAL
Qty: 0 | Refills: 0 | DISCHARGE

## 2022-06-02 RX ORDER — PRASUGREL 5 MG/1
1 TABLET, FILM COATED ORAL
Qty: 30 | Refills: 3
Start: 2022-06-02 | End: 2022-09-29

## 2022-06-02 RX ADMIN — Medication 12.5 MILLIGRAM(S): at 05:36

## 2022-06-02 RX ADMIN — Medication 81 MILLIGRAM(S): at 09:48

## 2022-06-02 RX ADMIN — CHLORHEXIDINE GLUCONATE 1 APPLICATION(S): 213 SOLUTION TOPICAL at 05:37

## 2022-06-02 RX ADMIN — METHOCARBAMOL 750 MILLIGRAM(S): 500 TABLET, FILM COATED ORAL at 05:36

## 2022-06-02 RX ADMIN — SODIUM CHLORIDE 150 MILLILITER(S): 9 INJECTION INTRAMUSCULAR; INTRAVENOUS; SUBCUTANEOUS at 14:36

## 2022-06-02 RX ADMIN — GABAPENTIN 100 MILLIGRAM(S): 400 CAPSULE ORAL at 15:05

## 2022-06-02 RX ADMIN — PANTOPRAZOLE SODIUM 40 MILLIGRAM(S): 20 TABLET, DELAYED RELEASE ORAL at 05:36

## 2022-06-02 RX ADMIN — METHOCARBAMOL 750 MILLIGRAM(S): 500 TABLET, FILM COATED ORAL at 00:42

## 2022-06-02 RX ADMIN — METHOCARBAMOL 750 MILLIGRAM(S): 500 TABLET, FILM COATED ORAL at 15:05

## 2022-06-02 RX ADMIN — METHOCARBAMOL 750 MILLIGRAM(S): 500 TABLET, FILM COATED ORAL at 17:05

## 2022-06-02 RX ADMIN — SODIUM CHLORIDE 150 MILLILITER(S): 9 INJECTION INTRAMUSCULAR; INTRAVENOUS; SUBCUTANEOUS at 14:37

## 2022-06-02 RX ADMIN — PRASUGREL 10 MILLIGRAM(S): 5 TABLET, FILM COATED ORAL at 09:48

## 2022-06-02 RX ADMIN — Medication 12.5 MILLIGRAM(S): at 17:05

## 2022-06-02 NOTE — CHART NOTE - NSCHARTNOTEFT_GEN_A_CORE
PHYSICAL EXAM:           CONSTITUTIONAL: Well-developed; well-nourished; in no acute distress  	SKIN: warm, dry  	HEAD: Normocephalic; atraumatic  	EYES: PERRL.  	ENT: No nasal discharge, airway clear, mucous membranes moist  	NECK: Supple; non tender.  	CARD: +S1, +S2, no murmurs, gallops, or rubs. Regular rate and rhythm    	RESP: No wheezes, rales or rhonchi. CTA B/L  	ABD: soft ntnd, + BS x 4 quadrants  	EXT: moves all extremities,  no clubbing, cyanosis or edema  	NEURO: Alert and oriented x3, no focal deficits          PSYCH: Cooperative, appropriate          VASCULAR:  + Rad / + PTs / +  DPs          EXTREMITY  	   Right Radial: pressure dressing removed, access site soft, sore to touch, no hematoma, no pain, + pulses, no sign of infection, no numbness            Patient pharmacy called in for Effient prescription and it is $1 per month

## 2022-06-02 NOTE — DISCHARGE NOTE PROVIDER - HOSPITAL COURSE
52M with past medical history of CAD status post 1 stent, and cervical radiculopathy presents to the ED complaining of moderate left-sided chest pain that radiates to his left arm.  Pain was pressure-like, constant, associated with elevated BP.  EMS brought patient to [facility], was given 1 sublingual nitro and 324 mg ASA with improvement in symptoms.  Patient did not stay to be evaluated at [facility] and left to get care here.  Patient has not followed up with a cardiologist for several years.  Patient only taking baby aspirin occasionally now, along with gabapentin and methocarbamol for neck pain.  Patient states pain has improved greatly since onset and now he feels slight pinching in the left side of his chest.  He denies other complaints. Pt denies fever, chills, nausea, vomiting, abdominal pain, diarrhea, headache, dizziness, weakness, SOB, back pain, LOC, trauma, urinary symptoms, cough, calf pain/swelling, recent travel, recent surgery.    Initially p/w atypical chest pain (2/3 criteria). No acute ischemic changes on admission EKG. Tn 0.09->0.44->0.86 thereby ruling in NSTEMI. A1c 5.7%, TSH 1.45, . LHC 6/1 demonstrated significant double vessel SONALI. S/P DESx1 to D1. Second LHC on 6/2 for staged PCI. S/P DESx1 to mRCA.    Patient to be discharged on the following medications:  - ASA 81mg QD  - Effient 10mg QD  - Rosuvastatin 10mg QHS  - Toprol XL 25mg QD

## 2022-06-02 NOTE — CHART NOTE - NSCHARTNOTEFT_GEN_A_CORE
PRE-OP DIAGNOSIS:    NSTEMI, Staged PCI of mid RCA, AUC of 8    PROCEDURE:     [x] Coronary Angiogram     [] LHC     [] LVG     [] RHC     [x] Intervention (see below)         PHYSICIAN:  Dr. Sherman    ASSISTANT:  Dr. Talbot       PROCEDURE DESCRIPTION:     Consent:      [x] Patient     [] Family Member     []  Used        Anesthesia:     [] General     [x] Sedation     [x] Local        Access & Closure:     [x] 6 Fr right Radial Artery (D-stat)    [] Fr Femoral Artery     [] Fr Femoral Vein     [] Fr Brachial Vein       IV Contrast: 120 mL        Intervention:   - successful balloon angioplasty and OTILIA x 1 implantation in mid RCA    Implants:   - 2.5 x 12 mm Synergy OTILIA x 1 in mid RCA    FINDINGS:     Coronary Dominance: co-dominant      LM: normal    LAD: mild disease in mid LAD  D1: mild disease, patent stent in D1    LCX: 60-70% stenosis  OM1: mild disease      RCA:  90% stenosis of mid RCA, s/p PCI         ESTIMATED BLOOD LOSS: < 10 mL        CONDITION:     [x] Good     [] Fair     [] Critical        SPECIMEN REMOVED: N/A       POST-OP DIAGNOSIS:      [] Normal Coronary Angiogram     [] Mild Coronary Artery Disease (< 50% stenosis)     [x] 2 Vessel Coronary Artery Disease (RCA, and LCX) s/p PCI of mid RCA       PLAN OF CARE:     [x] D/C Home Today (can be discharged later tonight, 6 hours post procedure)    [x] Medications:   - start hydrophilic statin (rosuvastatin, and pravastatin) prior to discharge  - continue with ASA 81mg, Effient, and Lopressor 12.5mg po q12    [x] IV Fluids:  NS@150cc/hr x 6 hours

## 2022-06-02 NOTE — DISCHARGE NOTE PROVIDER - CARE PROVIDERS DIRECT ADDRESSES
[email or web address]

## 2022-06-02 NOTE — PROGRESS NOTE ADULT - ASSESSMENT
52M w/ h/o CAD (s/p PCI x1) p/w chest pain and admitted for NSTEMI. Pending C.    #NSTEMI  #Coronary Artery Disease  #Dyslipidemia  Initially p/w atypical chest pain (2/3 criteria). No acute ischemic changes on admission EKG. Tn 0.09->0.44->0.86 thereby ruling in NSTEMI. A1c 5.7%, TSH 1.45, . LHC 6/1 demonstrated significant double vessel SONALI. S/P DESx1 to D1.   - c/w ASA 81mg PO QD  - c/w Plavix 75mg PO QD  - c/w heparin gtt (ACS protocol)  - continue to hold statin in setting of acute transaminitis  - c/w Lopressor 12.5mg PO BID  - repeat [facility] today for staged PCI; f/u post-cath report    #Acute Transaminitis  Liver enzymes elevated day after admission. AST//54.  - trend AST/ALT via daily CMP (AST/ALT 87/48 on 6/2 AM labs)    #Cervical Radiculopathy  Known h/o cervical radiculopathy involving numbness/tingling in R arm. Scheduled for MR C-spine outpatient. MRI C-Spine attempted inpatient, but limited as exam terminated prematurely due to pain.  - c/w Robaxin 750mg PO QID  - c/w gabapentin 100mg PO QAM  - c/w gabapentin 200mg PO QHS    DVT PPX: none prior to [facility] today  GI PPX: pantoprazole 40mg PO QD  DIET: DASH/TLC; NPO since MN  ACTIVITY: IAT  DISPOSITION: From Home    PENDING: repeat [facility] today (staged PCI) 52M w/ h/o CAD (s/p PCI x1) p/w chest pain and admitted for NSTEMI. Pending C.    #NSTEMI  #Coronary Artery Disease  #Dyslipidemia  Initially p/w atypical chest pain (2/3 criteria). No acute ischemic changes on admission EKG. Tn 0.09->0.44->0.86 thereby ruling in NSTEMI. A1c 5.7%, TSH 1.45, . LHC 6/1 demonstrated significant double vessel SONALI. S/P DESx1 to D1.   - c/w ASA 81mg PO QD  - c/w Plavix 75mg PO QD  - c/w heparin gtt (ACS protocol)  - continue to hold statin in setting of acute transaminitis  - c/w Lopressor 12.5mg PO BID  - repeat [facility] today for staged PCI; f/u post-cath report    #Acute Transaminitis  Liver enzymes elevated day after admission. AST//54.  - trend AST/ALT via daily CMP (AST/ALT 87/48 on 6/2 AM labs)    #Cervical Radiculopathy  Known h/o cervical radiculopathy involving numbness/tingling in R arm. Scheduled for MR C-spine outpatient. MRI C-Spine attempted inpatient, but limited as exam terminated prematurely due to pain.  - c/w Robaxin 750mg PO QID  - c/w gabapentin 100mg PO QAM  - c/w gabapentin 200mg PO QHS    DVT PPX: none prior to [facility] today  GI PPX: pantoprazole 40mg PO QD  DIET: DASH/TLC; NPO since MN  ACTIVITY: IAT  DISPOSITION: From Home    PENDING: repeat [facility] today (staged PCI)

## 2022-06-02 NOTE — PROGRESS NOTE ADULT - HEIGHT IN INCHES
POST-RADIATION INSTRUCTIONS:   - PLEASE STOP AT  OR CALL [phone] FOR A FOLLOW-UP WITH DR. CONSTANTINE MICHAEL  - FOLLOW-UP WITH DR. Brenda Gonzalez UPON COMPLETION OF RADIATION  - FOLLOW-UP WITH DR. Donna Hurt AS DIRECTED  - SIDE EFFECTS OF RADIATION WILL GRA
8

## 2022-06-02 NOTE — DISCHARGE NOTE PROVIDER - NSDCMRMEDTOKEN_GEN_ALL_CORE_FT
Aspir 81 oral delayed release tablet: 1 tab(s) orally once a day  gabapentin 100 mg oral capsule: 1 cap(s) orally once a day  gabapentin 100 mg oral capsule: 2 cap(s) orally once a day (at bedtime)  methocarbamol 750 mg oral tablet: 1 tab(s) orally every 6 hours  metoprolol succinate 25 mg oral tablet, extended release: 1 tab(s) orally once a day   prasugrel 10 mg oral tablet: 1 tab(s) orally once a day MDD:1  rosuvastatin 10 mg oral tablet: 1 tab(s) orally once a day (at bedtime)    aspirin 81 mg oral delayed release tablet: 1 tab(s) orally once a day  gabapentin 100 mg oral capsule: 1 cap(s) orally once a day  gabapentin 100 mg oral capsule: 2 cap(s) orally once a day (at bedtime)  methocarbamol 750 mg oral tablet: 1 tab(s) orally every 6 hours  metoprolol succinate 25 mg oral tablet, extended release: 1 tab(s) orally once a day   prasugrel 10 mg oral tablet: 1 tab(s) orally once a day MDD:1  rosuvastatin 10 mg oral tablet: 1 tab(s) orally once a day (at bedtime)

## 2022-06-02 NOTE — DISCHARGE NOTE NURSING/CASE MANAGEMENT/SOCIAL WORK - PATIENT PORTAL LINK FT
You can access the FollowMyHealth Patient Portal offered by [facility] by registering at the following website: [email or web address]. By joining [email or web address]’s FollowMyHealth portal, you will also be able to view your health information using other applications (apps) compatible with our system.

## 2022-06-02 NOTE — DISCHARGE NOTE PROVIDER - NSDCCPCAREPLAN_GEN_ALL_CORE_FT
PRINCIPAL DISCHARGE DIAGNOSIS  Diagnosis: NSTEMI (non-ST elevation myocardial infarction)  Assessment and Plan of Treatment: You came to the hospital with chest pain. In the ED, your lab work demonstrated you were developing a myocardial infarcation, more commonly known as a "heart attack." You were then taken to the catheterization lab the next day to have a coronary angiogram to evaluate your coronary arteries.  Your angiogram demonstrated severe narrowing in one of your arteries, so two stents were placed to alleviate this narrowing. Additionally, you were started on two medications called aspirin and Effient (prasugrel) to prevent your stents from becoming blocked in the future. You were also started on a cholesterol-lowering medication (Crestor) and a heart rate lowering medication (metoprolol), which have been proven to show long-term cardiac benefits in patients with coronary artery disease. Please take these medications as prescribed and discuss with your cardiologist how long you will need to take them for.  Please schedule an outpatient appointment with Dr. Sherman (cardiology) within two weeks after discharge for follow-up. Additionally, please follow up with your primary care physician within two weeks after discharge to update them on your recent hospitalization and to review changes in your medications.   If you have any severe repeat chest pain, difficulty breathing, or bleeding from the catheterization access site, please return to the hospital IMMEDIATELY for further evaluation.

## 2022-06-02 NOTE — DISCHARGE NOTE PROVIDER - NSDCFUSCHEDAPPT_GEN_ALL_CORE_FT
Raeann De La Cruz  [facility]  NEUROSURG [address]  Scheduled Appointment: 06/13/2022    Mp David  [facility]  INTMED [address]  Scheduled Appointment: 06/15/2022

## 2022-06-02 NOTE — DISCHARGE NOTE PROVIDER - NSDCACTIVITY_GEN_ALL_CORE
- No driving x 24 hours  - No heavy lifting >10lbs x 1 week  - No baths, swimming pools x 1 week (may shower)

## 2022-06-02 NOTE — CHART NOTE - NSCHARTNOTESELECT_GEN_ALL_CORE
Cardiology NP/Event Note
Cardiology F/U s/p PCI/Event Note
Cath note - brief post procedure note/Event Note
post cath note/Event Note

## 2022-06-02 NOTE — PROGRESS NOTE ADULT - SUBJECTIVE AND OBJECTIVE BOX
************************************************  Gutierrez Olvera MD (PGY-1)  Spectra: [phone]  ************************************************    SUBJECTIVE / OVERNIGHT EVENTS  Underwent LHC yesterday w/ one stent placed. Scheduled for repeat LHC today for staged PCI. No acute complaints this AM. Patient does not report fevers, chills, CP, SOB, or n/v/d    MEDICATIONS  aspirin enteric coated 81 milliGRAM(s) Oral daily  chlorhexidine 4% Liquid 1 Application(s) Topical <User Schedule>  gabapentin 100 milliGRAM(s) Oral daily  gabapentin Oral Tab/Cap - Peds 200 milliGRAM(s) Oral at bedtime  methocarbamol 750 milliGRAM(s) Oral every 6 hours  metoprolol tartrate 12.5 milliGRAM(s) Oral two times a day  pantoprazole    Tablet 40 milliGRAM(s) Oral before breakfast  prasugrel 10 milliGRAM(s) Oral daily  sodium chloride 0.9%. 1000 milliLiter(s) IV Continuous <Continuous>    acetaminophen     Tablet .. 650 milliGRAM(s) Oral every 6 hours PRN Temp greater or equal to 38C (100.4F), Mild Pain (1 - 3)  aluminum hydroxide/magnesium hydroxide/simethicone Suspension 30 milliLiter(s) Oral every 4 hours PRN Dyspepsia    VITALS /  EXAM    T(C): 37.4 (06-02-22 @ 05:35), Max: 37.8 (06-01-22 @ 12:40)  HR: 68 (06-02-22 @ 05:35) (68 - 88)  BP: 108/63 (06-02-22 @ 05:35) (108/63 - 141/73)  RR: 18 (06-02-22 @ 05:35) (18 - 18)  SpO2: --    GENERAL: NAD, well-developed  CHEST/LUNG: Clear to auscultation bilaterally; No wheezes, rales or rhonchi  HEART: Regular rate and rhythm; No murmurs, rubs, or gallops  ABDOMEN: Soft, Nontender, Nondistended; Bowel sounds present, no masses.  EXTREMITIES:  2+ Peripheral Pulses, No clubbing, cyanosis, or edema    I's & O's     06-01-22 @ 07:01  -  06-02-22 @ 07:00  --------------------------------------------------------  IN:    sodium chloride 0.9%: 900 mL  Total IN: 900 mL    OUT:  Total OUT: 0 mL    Total NET: 900 mL    LABS             14.5   11.75 )-----------( 231      ( 06-02-22 @ 06:14 )             41.4     138  |  103  |  14  -------------------------<  97   06-02-22 @ 06:14  4.3  |  22  |  0.8    Ca      8.7     06-02-22 @ 06:14  Mg     2.2     06-01-22 @ 06:19    TPro  6.6  /  Alb  4.0  /  TBili  0.8  /  DBili  x   /  AST  87  /  ALT  48  /  AlkPhos  118  /  GGT  x     06-02-22 @ 06:14    PTT - ( 06-01-22 @ 00:29 )  PTT:49.9 sec    Troponin T, Serum: 0.86 ng/mL (05-31-22 @ 06:25)  Troponin T, Serum: 0.44 ng/mL (05-31-22 @ 02:03)  Troponin T, Serum: 0.09 ng/mL (05-30-22 @ 22:12)    CKMB Units: 194.8 ng/mL (05-31-22 @ 06:25)  CKMB Units: 99.5 ng/mL (05-31-22 @ 02:03)    Serum Pro-Brain Natriuretic Peptide: 5 pg/mL (05-30-22 @ 22:12)    MICRO / IMAGING / CARDIOLOGY  Telemetry: Reviewed   EKG: Reviewed    MR Cervical Spine No Cont (06.01.22 @ 20:22)  1. Limited incomplete examination (terminated prematurely as per patient's request due to pain)  2. Reversed cervical lordosis with multilevel spondylosis, most severe at C4-5 with moderate-severe spinal stenosis and severe bilateral foraminal stenosis secondary to circumferential disc osteophyte complex. There is mild flattening of the cord at this level and edema cannot be excluded.  3. Consider repeat exam as clinically indicated.

## 2022-06-02 NOTE — DISCHARGE NOTE PROVIDER - CARE PROVIDER_API CALL
Ted Kim  [facility]  [address] [place] [address]  Phone: [phone]  Fax: [phone]  Established Patient  Follow Up Time: 2 weeks    Van Sherman)  Cardiovascular Disease; Internal Medicine; Interventional Cardiology; Nuclear Cardiology  [address] [place] [address], [facility] [address]  [place] [address]  Phone: [phone]  Fax: [phone]  Follow Up Time: 2 weeks

## 2022-06-02 NOTE — DISCHARGE NOTE NURSING/CASE MANAGEMENT/SOCIAL WORK - NSDCPEFALRISK_GEN_ALL_CORE
For information on Fall & Injury Prevention, visit: [email or web address] OR  [email or web address] OR  https://www.cdc.gov/steadi/patient.html

## 2022-06-02 NOTE — DISCHARGE NOTE PROVIDER - PROVIDER TOKENS
PROVIDER:[TOKEN:[[id number]],FOLLOWUP:[2 weeks],ESTABLISHEDPATIENT:[T]],PROVIDER:[TOKEN:[[id number]],FOLLOWUP:[2 weeks]]

## 2022-06-13 DIAGNOSIS — F17.210 NICOTINE DEPENDENCE, CIGARETTES, UNCOMPLICATED: ICD-10-CM

## 2022-06-13 DIAGNOSIS — R74.01 ELEVATION OF LEVELS OF LIVER TRANSAMINASE LEVELS: ICD-10-CM
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
## 2022-06-13 DIAGNOSIS — Z95.5 PRESENCE OF CORONARY ANGIOPLASTY IMPLANT AND GRAFT: ICD-10-CM

## 2022-06-13 DIAGNOSIS — M54.12 RADICULOPATHY, CERVICAL REGION: ICD-10-CM

## 2022-06-13 DIAGNOSIS — I21.4 NON-ST ELEVATION (NSTEMI) MYOCARDIAL INFARCTION: ICD-10-CM

## 2022-06-13 DIAGNOSIS — E78.5 HYPERLIPIDEMIA, UNSPECIFIED: ICD-10-CM

## 2022-06-13 DIAGNOSIS — T82.855A STENOSIS OF CORONARY ARTERY STENT, INITIAL ENCOUNTER: ICD-10-CM

## 2022-06-13 DIAGNOSIS — R07.9 CHEST PAIN, UNSPECIFIED: ICD-10-CM
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
## 2022-06-13 DIAGNOSIS — I25.10 ATHEROSCLEROTIC HEART DISEASE OF NATIVE CORONARY ARTERY WITHOUT ANGINA PECTORIS: ICD-10-CM

## 2022-06-14 ENCOUNTER — APPOINTMENT (OUTPATIENT)
Dept: CARDIOLOGY | Facility: CLINIC | Age: 52
End: 2022-06-14
Payer: MEDICAID

## 2022-06-14 VITALS
DIASTOLIC BLOOD PRESSURE: 58 MMHG | WEIGHT: 202.6 LBS | BODY MASS INDEX: 29.01 KG/M2 | HEART RATE: 70 BPM | SYSTOLIC BLOOD PRESSURE: 104 MMHG | HEIGHT: 70 IN

## 2022-06-14 DIAGNOSIS — Z87.891 PERSONAL HISTORY OF NICOTINE DEPENDENCE: ICD-10-CM

## 2022-06-14 DIAGNOSIS — Z98.61 CORONARY ANGIOPLASTY STATUS: ICD-10-CM

## 2022-06-14 PROCEDURE — 99214 OFFICE O/P EST MOD 30 MIN: CPT | Mod: 25

## 2022-06-14 PROCEDURE — 93000 ELECTROCARDIOGRAM COMPLETE: CPT

## 2022-06-14 RX ORDER — ASPIRIN 81 MG/1
81 TABLET, COATED ORAL
Qty: 30 | Refills: 0 | Status: ACTIVE | COMMUNITY
Start: 2022-06-02

## 2022-06-14 NOTE — ASSESSMENT
[FreeTextEntry1] : 52M with CAD s/p PCI (2013), s/p PCI D1 on 6/1/2022, s/p PCI mRCA on 6/2/2022, dLCx 60-70%.

## 2022-06-14 NOTE — HISTORY OF PRESENT ILLNESS
[FreeTextEntry1] : 52M with CAD s/p PCI (2013), s/p PCI D1 on 6/1/2022, s/p PCI mRCA on 6/2/2022, dLCx 60-70% presents today to establish care.  Records and imaging reviewed.  Quit smoking.  Feels well.  No angina.  Cervical neck pain completely resolved.\par \par Originally from [place]\par Works as an [employer] tech\par \par \par EKG (6/14/2022):  NSR, IRBBB, no ST-T changes\par

## 2022-06-14 NOTE — PHYSICAL EXAM
[Well Developed] : well developed [No Acute Distress] : no acute distress [Normal Conjunctiva] : normal conjunctiva [No Carotid Bruit] : no carotid bruit [Normal S1, S2] : normal S1, S2 [No Murmur] : no murmur [No Rub] : no rub [Clear Lung Fields] : clear lung fields [Good Air Entry] : good air entry [No Respiratory Distress] : no respiratory distress  [Soft] : abdomen soft [Non Tender] : non-tender [Normal Gait] : normal gait [No Edema] : no edema [No Cyanosis] : no cyanosis [No Clubbing] : no clubbing [No Rash] : no rash [No Skin Lesions] : no skin lesions [Moves all extremities] : moves all extremities [No Focal Deficits] : no focal deficits [Normal Speech] : normal speech [Alert and Oriented] : alert and oriented [Normal memory] : normal memory

## 2022-06-15 ENCOUNTER — APPOINTMENT (OUTPATIENT)
Dept: INTERNAL MEDICINE | Facility: CLINIC | Age: 52
End: 2022-06-15

## 2022-07-18 ENCOUNTER — APPOINTMENT (OUTPATIENT)
Dept: NEUROSURGERY | Facility: CLINIC | Age: 52
End: 2022-07-18

## 2022-07-18 VITALS — WEIGHT: 202 LBS | HEIGHT: 70 IN | BODY MASS INDEX: 28.92 KG/M2

## 2022-07-18 VITALS — HEIGHT: 67 IN | BODY MASS INDEX: 28.56 KG/M2 | WEIGHT: 182 LBS

## 2022-07-18 DIAGNOSIS — M48.02 SPINAL STENOSIS, CERVICAL REGION: ICD-10-CM

## 2022-07-18 PROCEDURE — 99204 OFFICE O/P NEW MOD 45 MIN: CPT

## 2022-07-18 RX ORDER — PRASUGREL 10 MG/1
10 TABLET, FILM COATED ORAL DAILY
Qty: 90 | Refills: 3 | Status: DISCONTINUED | COMMUNITY
Start: 2022-06-01 | End: 2022-07-18

## 2022-07-18 RX ORDER — METHOCARBAMOL 750 MG/1
750 TABLET, FILM COATED ORAL
Qty: 120 | Refills: 0 | Status: DISCONTINUED | COMMUNITY
Start: 2022-05-18 | End: 2022-07-18

## 2022-07-18 RX ORDER — TIZANIDINE 4 MG/1
4 TABLET ORAL
Qty: 56 | Refills: 0 | Status: DISCONTINUED | COMMUNITY
Start: 2022-05-12 | End: 2022-07-18

## 2022-07-18 RX ORDER — GABAPENTIN 100 MG/1
100 CAPSULE ORAL
Qty: 90 | Refills: 0 | Status: DISCONTINUED | COMMUNITY
Start: 2022-05-18 | End: 2022-07-18

## 2022-07-18 RX ORDER — ROSUVASTATIN CALCIUM 10 MG/1
10 TABLET, FILM COATED ORAL
Qty: 90 | Refills: 3 | Status: DISCONTINUED | COMMUNITY
Start: 2022-06-02 | End: 2022-07-18

## 2022-07-18 NOTE — HISTORY OF PRESENT ILLNESS
[de-identified] : I am seeing Mr. Hoover in follow up from the ER. he presented with severe neck pain and arm pain to the ER. Ultimately was found to have blockages in his old cardiac stent as well as new artery blockage requiring new stent. Subsequent to this he has no significant neck or arm pain. He had a partial, motion degraded MRI of the cervical spine which suggested significant stenosis at C4-5.

## 2022-07-18 NOTE — ASSESSMENT
[FreeTextEntry1] : WE will obtain an open MRI of the cervical spine as he was unable to tolerate closed. His severely limited and motion degraded MRI is suggestive of significant cord compression. \par \par I will see him back with new MRI.

## 2022-07-29 ENCOUNTER — APPOINTMENT (OUTPATIENT)
Dept: CARDIOLOGY | Facility: CLINIC | Age: 52
End: 2022-07-29

## 2022-08-12 ENCOUNTER — TRANSCRIPTION ENCOUNTER (OUTPATIENT)
Age: 52
End: 2022-08-12

## 2022-08-30 ENCOUNTER — OUTPATIENT (OUTPATIENT)
Dept: OUTPATIENT SERVICES | Facility: HOSPITAL | Age: 52
LOS: 1 days | Discharge: HOME | End: 2022-08-30

## 2022-10-26 ENCOUNTER — APPOINTMENT (OUTPATIENT)
Dept: CARDIOLOGY | Facility: CLINIC | Age: 52
End: 2022-10-26

## 2022-10-26 VITALS
DIASTOLIC BLOOD PRESSURE: 70 MMHG | SYSTOLIC BLOOD PRESSURE: 140 MMHG | HEART RATE: 66 BPM | WEIGHT: 204 LBS | BODY MASS INDEX: 32.02 KG/M2 | HEIGHT: 67 IN

## 2022-10-26 PROCEDURE — 99214 OFFICE O/P EST MOD 30 MIN: CPT | Mod: 25

## 2022-10-26 PROCEDURE — 93000 ELECTROCARDIOGRAM COMPLETE: CPT

## 2022-10-26 NOTE — HISTORY OF PRESENT ILLNESS
[FreeTextEntry1] : 53 yo M with a history of CAD s/p PCI (2013), s/p PCI D1 on 6/1/2022, s/p PCI mRCA on 6/2/2022, dLCx 60-70% presents for follow up.  Started smoking again 5 cigarettes/week.  Feels well.  No angina.  Cervical neck pain completely resolved.  Wants to try Viagra.\par \par Originally from [place]\par Works as an [employer] tech\par \par \par EKG (10/26/2022):  NSR, IRBBB, LAHB, no ST-T changes\par

## 2022-10-26 NOTE — ASSESSMENT
[FreeTextEntry1] : 52 M with CAD s/p PCI (2013), s/p PCI D1 on 6/1/2022, s/p PCI mRCA on 6/2/2022, dLCx 60-70%.\par \par \par Treadmill stress test\par Continue DAPT with aspirin and Prasugrel\par Continue Rosuvastatin\par Continue Metoprolol succinate 25 mg daily\par Follow up 4 months

## 2022-11-10 ENCOUNTER — APPOINTMENT (OUTPATIENT)
Dept: CARDIOLOGY | Facility: CLINIC | Age: 52
End: 2022-11-10

## 2022-11-10 PROCEDURE — 93015 CV STRESS TEST SUPVJ I&R: CPT

## 2023-02-22 ENCOUNTER — APPOINTMENT (OUTPATIENT)
Dept: CARDIOLOGY | Facility: CLINIC | Age: 53
End: 2023-02-22
Payer: MEDICAID

## 2023-02-22 VITALS
HEART RATE: 61 BPM | HEIGHT: 67 IN | BODY MASS INDEX: 32.33 KG/M2 | SYSTOLIC BLOOD PRESSURE: 136 MMHG | DIASTOLIC BLOOD PRESSURE: 74 MMHG | WEIGHT: 206 LBS | TEMPERATURE: 96.9 F

## 2023-02-22 DIAGNOSIS — N52.9 MALE ERECTILE DYSFUNCTION, UNSPECIFIED: ICD-10-CM

## 2023-02-22 PROCEDURE — 93000 ELECTROCARDIOGRAM COMPLETE: CPT

## 2023-02-22 PROCEDURE — 99214 OFFICE O/P EST MOD 30 MIN: CPT | Mod: 25

## 2023-02-22 NOTE — ASSESSMENT
[FreeTextEntry1] : 52 M with CAD s/p PCI (2013), s/p PCI D1 on 6/1/2022, s/p PCI mRCA on 6/2/2022, dLCx 60-70%.\par \par \par TTE to reevaluate LVEF\par Routine labs\par Continue DAPT with aspirin and Prasugrel\par Continue Rosuvastatin\par Continue Metoprolol succinate 25 mg daily\par Follow up 6 months

## 2023-02-22 NOTE — HISTORY OF PRESENT ILLNESS
[FreeTextEntry1] : 51 yo M with a history of CAD s/p PCI (2013), s/p PCI D1 on 6/1/2022, s/p PCI mRCA on 6/2/2022, dLCx 60-70% presents for follow up.  Started smoking again 5 cigarettes/week.  Cervical neck pain completely resolved.  Wants to try Viagra.\par \par Treadmill stress test was submaximal/nonischemia\par Denies an CP, SOB, palpitations, PND/orthopnea, dizziness or syncope.\par \par Originally from [place]\par Works as an [employer] tech\par \par \par EKG (10/26/2022):  NSR, IRBBB, LAHB, no ST-T changes\par

## 2023-06-06 ENCOUNTER — APPOINTMENT (OUTPATIENT)
Dept: CARDIOLOGY | Facility: CLINIC | Age: 53
End: 2023-06-06
Payer: MEDICAID

## 2023-06-06 LAB
ALBUMIN SERPL ELPH-MCNC: 4.6 G/DL
ALP BLD-CCNC: 110 U/L
ALT SERPL-CCNC: 42 U/L
ANION GAP SERPL CALC-SCNC: 14 MMOL/L
AST SERPL-CCNC: 27 U/L
BILIRUB SERPL-MCNC: 0.4 MG/DL
BUN SERPL-MCNC: 17 MG/DL
CALCIUM SERPL-MCNC: 9.5 MG/DL
CHLORIDE SERPL-SCNC: 107 MMOL/L
CHOLEST SERPL-MCNC: 146 MG/DL
CO2 SERPL-SCNC: 22 MMOL/L
CREAT SERPL-MCNC: 0.9 MG/DL
EGFR: 103 ML/MIN/1.73M2
ESTIMATED AVERAGE GLUCOSE: 114 MG/DL
GLUCOSE SERPL-MCNC: 100 MG/DL
HBA1C MFR BLD HPLC: 5.6 %
HDLC SERPL-MCNC: 28 MG/DL
LDLC SERPL CALC-MCNC: 71 MG/DL
NONHDLC SERPL-MCNC: 118 MG/DL
POTASSIUM SERPL-SCNC: 4.4 MMOL/L
PROT SERPL-MCNC: 7.5 G/DL
SODIUM SERPL-SCNC: 143 MMOL/L
TRIGL SERPL-MCNC: 233 MG/DL
TSH SERPL-ACNC: 2.78 UIU/ML

## 2023-06-06 PROCEDURE — 93306 TTE W/DOPPLER COMPLETE: CPT

## 2023-06-07 ENCOUNTER — APPOINTMENT (OUTPATIENT)
Dept: CARDIOLOGY | Facility: CLINIC | Age: 53
End: 2023-06-07
Payer: MEDICAID

## 2023-06-07 VITALS
WEIGHT: 210 LBS | BODY MASS INDEX: 32.89 KG/M2 | HEART RATE: 71 BPM | SYSTOLIC BLOOD PRESSURE: 122 MMHG | DIASTOLIC BLOOD PRESSURE: 70 MMHG

## 2023-06-07 PROCEDURE — 99214 OFFICE O/P EST MOD 30 MIN: CPT | Mod: 25

## 2023-06-07 PROCEDURE — 93000 ELECTROCARDIOGRAM COMPLETE: CPT

## 2023-06-07 NOTE — ASSESSMENT
[FreeTextEntry1] : 53 M with CAD s/p PCI (2013), s/p PCI D1 on 6/1/2022, s/p PCI mRCA on 6/2/2022, dLCx 60-70%.\par \par BP is controlled.\par No angina.\par \par TTE (6/2023):  EF >55%, no valvular disease\par

## 2023-06-07 NOTE — HISTORY OF PRESENT ILLNESS
[FreeTextEntry1] : 53 M with a history of CAD s/p PCI (2013), s/p PCI D1 on 6/1/2022, s/p PCI mRCA on 6/2/2022, dLCx 60-70%.  Cervical neck pain completely resolved.\par \par Denies an CP, SOB, palpitations, PND/orthopnea, dizziness or syncope.\par Occasional b/l LE calf pain when \par \par Eats after 9 PM and gained 10 lbs\par Stopped exercising for 3 months\par Trig 233\par LDL 71\par \par Started smoking again 5 cigarettes/week.\par \par Originally from [place]\par Works as an [employer] tech\par Multiple family members are physicians\par

## 2023-06-07 NOTE — DISCUSSION/SUMMARY
[FreeTextEntry1] : TTE reviewed\par Continue DAPT with aspirin and Prasugrel\par Continue Rosuvastatin\par Continue Metoprolol succinate 25 mg daily\par Follow up 6 months

## 2023-10-25 ENCOUNTER — APPOINTMENT (OUTPATIENT)
Dept: CARDIOLOGY | Facility: CLINIC | Age: 53
End: 2023-10-25
Payer: MEDICAID

## 2023-10-25 VITALS
HEART RATE: 77 BPM | DIASTOLIC BLOOD PRESSURE: 82 MMHG | WEIGHT: 209 LBS | BODY MASS INDEX: 32.73 KG/M2 | SYSTOLIC BLOOD PRESSURE: 130 MMHG

## 2023-10-25 DIAGNOSIS — K04.7 PERIAPICAL ABSCESS W/OUT SINUS: ICD-10-CM

## 2023-10-25 PROCEDURE — 99214 OFFICE O/P EST MOD 30 MIN: CPT | Mod: 25

## 2023-10-25 PROCEDURE — 93000 ELECTROCARDIOGRAM COMPLETE: CPT

## 2023-10-25 RX ORDER — METOPROLOL SUCCINATE 25 MG/1
25 TABLET, EXTENDED RELEASE ORAL DAILY
Qty: 90 | Refills: 3 | Status: ACTIVE | COMMUNITY
Start: 2022-06-02 | End: 1900-01-01

## 2023-10-25 RX ORDER — ROSUVASTATIN CALCIUM 10 MG/1
10 TABLET, FILM COATED ORAL
Qty: 90 | Refills: 3 | Status: ACTIVE | COMMUNITY
Start: 1900-01-01 | End: 1900-01-01

## 2023-10-25 RX ORDER — SILDENAFIL 25 MG/1
25 TABLET ORAL
Qty: 30 | Refills: 5 | Status: DISCONTINUED | COMMUNITY
Start: 2023-02-22 | End: 2023-10-25

## 2024-02-21 ENCOUNTER — APPOINTMENT (OUTPATIENT)
Dept: CARDIOLOGY | Facility: CLINIC | Age: 54
End: 2024-02-21
Payer: MEDICAID

## 2024-02-21 VITALS
BODY MASS INDEX: 33.36 KG/M2 | HEART RATE: 84 BPM | SYSTOLIC BLOOD PRESSURE: 116 MMHG | DIASTOLIC BLOOD PRESSURE: 80 MMHG | WEIGHT: 213 LBS

## 2024-02-21 PROCEDURE — 99214 OFFICE O/P EST MOD 30 MIN: CPT | Mod: 25

## 2024-02-21 PROCEDURE — 93000 ELECTROCARDIOGRAM COMPLETE: CPT

## 2024-02-21 RX ORDER — SILDENAFIL 50 MG/1
50 TABLET ORAL
Qty: 30 | Refills: 5 | Status: ACTIVE | COMMUNITY
Start: 2024-02-21 | End: 1900-01-01

## 2024-02-21 RX ORDER — TADALAFIL 5 MG/1
5 TABLET ORAL
Qty: 30 | Refills: 3 | Status: DISCONTINUED | COMMUNITY
Start: 2023-10-25 | End: 2024-02-21

## 2024-02-21 NOTE — DISCUSSION/SUMMARY
[FreeTextEntry1] : CAD s/p PCI (2013), s/p PCI D1 on 6/1/2022, s/p PCI mRCA on 6/2/2022, dLCx 60-70%   Continue DAPT with aspirin and Prasugrel Continue Rosuvastatin Continue Metoprolol succinate 25 mg daily Follow-up 6 months

## 2024-02-21 NOTE — HISTORY OF PRESENT ILLNESS
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

## 2024-05-16 RX ORDER — PRASUGREL 10 MG/1
10 TABLET, FILM COATED ORAL
Qty: 90 | Refills: 3 | Status: ACTIVE | COMMUNITY
Start: 1900-01-01 | End: 1900-01-01

## 2024-06-24 NOTE — ED ADULT TRIAGE NOTE - HISTORY OF COVID-19 VACCINATION
6/26/2024      [facility] [address]    Dear Ms. Acuna,    Your procedure is scheduled for September 24, 2024 with Dr. Dariusz Darden at:    [facility]  [address] [place]   [address]  [phone]  Please enter the main entrance and take the elevators to the 3rd floor.  Check in at Same Day Surgery desk.    You can expect to be contact by Paul 2 days prior to surgery to confirm arrival and surgery time.    The following appointment(s) have been scheduled for you:    Post-op with Dr. Darden at the [facility], Medical Office [address], [address] ([address], [place] [address]) on October 9, 2024 at 1:00 pm .    To better prepare for your surgery, please follow these instructions:    You DO NOT need to see your Primary Care Provider for a pre op appointment prior to surgery per Dr. Darden.    All Weight Loss Medication (both traditional weight loss and diabetic medications used to treat weight loss) must be stopped 7 days before surgery or your surgery will be cancelled.  If you are diabetic, please consult with your prescribing physician for any alternative or additional medication instructions before surgery.    Starting 5 days prior to your surgery, please do not take any aspirin products, anti-inflammatory medication or blood thinners.  This includes products such as Daina[place], Pepto Bismol, Motrin, Ibuprofen and Advil should also be avoided.  (Standard Tylenol products are aspirin free, so Standard Tylenol products are OK to take for pain.).              If you take any other prescription medication, including blood thinners such as Coumadin, Eliquis, Plavix, or Xarelto, please contact your ordering or primary care physician ASAP, so that you can inform them about your upcoming surgery and so that they can decide with you if any changes to your medication schedule are necessary.  If  approved by your physician, you may take blood pressure and heart medications the morning of the procedure with a small amount of water.    Do not have anything to eat or drink starting at midnight the night before your surgery.     Be sure you use your HibiClens!    It is a topical antiseptic, antimicrobial skin cleanser; Hibiclens gently and effectively cleanses skin and superficial wounds and can protect the skin for up to 24 hours. It's gentle on patient skin and as simple and easy to use as any liquid soap. If not provided by your doctor's office, purchase an 8 ounce bottle at your local pharmacy as this is over the counter.    For your safety, you must have a ride home after surgery, due to both anesthesia and post-op pain medication. You must be with someone who can take responsibility for you and assist with your discharge from the surgery center, not a cab, bus, etc.  You will need someone available to remain with you up to 24 hours after you have been discharged.    Please remember that all times are subject to change as the hospital coordinates the schedule to meet the needs of your surgery and the overall flow of the OR that day.  You will be called ASAP to advise you of any changes to your surgery time or the time you need to arrive for your surgery.    Pre-Procedural COVID Testing is NOT required as long as you remain symptom-free from now through your surgery date.  At any time, if you experience COVID-like symptoms, please contact your primary care physician for evaluation.  If you test positive for COVID between now and your surgery date, please call our office as soon as possible.  We might need to postpone your procedure until it is safe for you to proceed. For more information, please visit our website at [email or web address]       Before your surgery, you will receive an invitation via email from [employer], our partner in Patient  Education.    This informative web-based education program will give you helpful information pertaining to your upcoming surgery and recovery.  We encourage you to watch the videos before your surgery. They contain valuable information that will help you know what to expect, what you can do to recover and answer some of the questions you may have. Having that knowledge can help you work towards getting back to your normal routines as soon as possible.      You may also receive an invitation from us via your LiveWell account a short 5 min questionnaire regarding the impact your diagnosis has on your current abilities and activities.  Our hospital and clinic teams will help you complete this if you don't have a chance to on your own (or do not have a current W[employer] account).  Your surgeon is hoping to use this information to geovany and monitor your progress starting before your surgery and in the weeks/months during your recovery after your procedure.  Thank you for your participation!    If you have any work related and/or disability forms that need to be completed, please contact the Forms Completion Department at [phone]. Forms can be dropped off at any of our [facility] locations. Please be advised that it can take 7 to 14 business days to complete these requests.    If you have questions regarding the procedure, medications, rehab, etc., please contact the nursing staff at [phone].    If you have any scheduling questions or need to reschedule, please contact me at the telephone number and extension listed below.     Thank you,    Paul at [phone]  Surgery Scheduler for Dr. Dariusz Darden  [facility]                                                                        Insurance Authorization Need to Know’s     Prior to your surgical procedure, our team will contact your Insurance Company to initiate a PreAuthorization request.       This is not a guarantee of payment from  your insurance company, but rather a step taken to ensure that we have all of the information and documentation for them to confirm the procedure is one that is eligible for coverage under your plan.     We will contact you if we either need more information from you to fulfill the requirements of your insurance company, or if we need to discuss any concerns that may lead to postponement or cancellation of your procedure. If you have any questions regarding your surgery authorization, please check with your insurance company or call our office for an update.     If you need information regarding your level of benefits or out-of-pocket expenses, please contact your insurance company directly.  They can also confirm for you whether or not we (the surgeon and the hospital/surgery center) are in your plan’s preferred network (aka ‘in-network’).     What to do if… My Insurance Changes:  If, at any time, your insurance company, plan or even card changes… Please call our office so that our team can be sure to update your records.  We will need to make sure to submit any PreAuth or vilma to the correct, up-to-date insurance plan.       What to do if… My Insurance Requires A Referral:  If your insurance company requires a Referral for Specialty Care or to see a Specialist, you will need to confirm with them if you have one on file.    If your insurance carrier does not have a referral, then you will need to contact your Primary Care Physician to have one directly submitted to your insurance company ASAP.    Without a referral on file, your insurance company will not Pre-Authorize your surgery and may not cover any of your care with our specialty.     What to do if… I have a Workers Compensation (W/C) Claim:  If you have a W/C claim, please be sure to provide our reception team with the information you have regarding your claim ASAP.  We will contact your W/C carrier/adjustor to inform them of your upcoming surgery and  check the status of your claim (open vs closed).  We will let you know if they advise of any concerns or issues with your claim.  Even if you have an open W/C claim, please also provide us with your personal/family insurance.  We will want to be sure this plan is loaded into your account.  We always PreAuthorize with personal insurance as a back-up to W/C.  Otherwise, if W/C doesn’t cover something along the way, you will receive a bill for the services.     What to do if… I have Month-to-Month Coverage/Premiums:  If you have an insurance plan that is paid for month to month, or is subject to plan change on a monthly basis, please be aware we cannot initiate PreAuthorization until just before the month of your surgery, as your insurance company will need to verify your premium payments/eligibility first.     What to do if… I Do Not Have Insurance Coverage or Have other Insurance/Billing Questions:  Please call our Patient Contact Center:  [phone] to speak with a team member about your billing needs, including possible coverage options, setting up payment plans, our fee schedule, etc.        MEDICATIONS TO STOP / CHANGE BEFORE SURGERY    Please read through this list to make sure you are adjusting your medication appropriately before surgery.  Failure to do so might result in cancellation or rescheduling surgery.    BLOOD THINNERS / ANTICOAGULATION MEDICATIONS    If you take prescription medication that is a blood thinner, please contact the prescribing provider or primary care ASAP to determine when to hold the medication prior to surgery.     Examples include:  Warfarin (Coumadin)     Clopidrogel (Plavix)  Apixaban (Eliquis)  Rivaroxaban (Xarelto)    ASPIRIN and ANTI-INFLAMMATORY (NSAID) PRODUCTS     Do not take FIVE (5) days prior to procedure.      OVER-THE-COUNTER:      Aspirin… including:  Anacin, Vasquez, Bourg, Aspergum, Aspercin, Aspermin, Aspertab, Back-quell, Duradyne, Empirin, Gemnisyn, Genprin,  Gensan, Magnaprin, McNess Pain Tab, Momentum, P-A-C, Pain Reliever Tabs, Tri-Pain Caplets, Vanquish Caplet.  Buffered Aspirin… including:  Ascriptin, Bufferin, Ecotrin, Buffaprin, Buffasal, Buffinol, COPE.  Aspirin Suppositories (generic, any strength)  Excedrin, Excedrin Extra, Excedrin IB  Ibuprofen… including: Advil, Nuprin, Motrin IB, Adapin, Genpril, Ibufen 200, Menadol, Midol IB, Dristan Sinus, Ursinus Inlay Tabs, Dimetapp Sinus, Valparin, Haltran Tabs, Wayne's Pills, Conrado.  Naproxen… including: Aleve  Daina [place] or Bromo-[place]att[place]  Pepto Bismol     PRESCRIPTION:  Brand Name Generic Name Brand Name Generic Name      Fiorinal   butalbital, aspirin, caffeine    Lodine   etodolac      Naprosyn, Anaprox   naproxen    Mobic   meloxicam      Voltaren, Cataflam   diclofenac    Meclomen   meclofenamate      Feldene   piroxicam    Nalfon   fenoprofen      Motrin (Rx), Rufen   ibuprofen    Ponstel   mefenamic acid      Ansaid   flurbiprofen    Relafen   nabumetone      Orudis   ketoprofen    Toradol   ketorolac      Dolobid   diflunisal    Azdone Tabs   aspirin plus hydrocodone      Clinoril   sulindac    Percodan   aspirin plus oxycodone      Indocin   indomethacin    Synalgos   aspirin plus dihydrocodeine      Tolectin   tolmetin    Daypro   oxaprozin          WEIGHT LOSS MEDICATIONS    If you are taking these medications and have DIABETES please contact your primary care doctor about when to stop these medications and whether you will need an alternative medication.  If on WEEKLY dosing, hold SEVEN (7) days prior to procedure.   If on DAILY dosing, hold on the day of procedure.   Dulaglutide (Trulicity)  Exenatide (Bydureon BCise, Byetta)  Liraglutide (Victoza, Saxenda)  Pramlintide acetate (Symlin)  Semaglutide (Ozempic, Wegovy, Rybelsus)  Tirzepatide (Mounjaro)  Liraglutide + insulin Degludec (Xultophy)  Lixisenatide + insulin Glargine (Soliqua)    Do not take SEVEN (7) days prior to procedure.     Benzphetamine  Diethylpropion  Phendimetrazine  Phentermine (Adipex, Lomaira)  Phentermine/topiramate (Qsymia)...  Note: to prevent seizures from abrupt withdrawal, take a dose every other day  for at least 1 week before stopping treatment.    Herbs and Dietary Supplements    Do not take FIVE (5) days prior to procedure.        Alfalfa    American ginseng    Zeinab    Anise    Arnica montana    Asafetida    [place] bark    Bilberry    Birch    Black cohosh    Bladderwrack    Bogbean    Boldo    Borage seed oil    Bromelain    Capsicum    Cat's claw    Celery     Chamomile   [place]    Clove    Coleus    Cordyceps       Dandelion     Danshen    Devil's claw    Dong quai    EPA (eicosapentaenoic    acid, found in fish oils)    Evening primrose oil    Fenugreek    Feverfew    Fish oil    Flaxseed/flax powder     Garlic    Karen    Ginkgo biloba    Grapefruit juice     Grapeseed     Green tea    Guggul    Gymnestra    Horse chestnut    Horseradish    Licorie root    Lovage root    Male fern    Юлия    Melatonin    Nordihydroguairetic acid (NDGA)    Omega-3 fatty acids    Onion    Papain    Panax ginseng    Parsley      Passionflower    Poplar   Prickly renetta    Propolis    Quassia    Red clover    Reishi    Saw palmetto    Siberian ginseng    Sweet clover    Rue    Sweet birch    Turmeric    Vitamin E    White willow    Wild carrot    Wild lettuce    Mora    [place]    Yucca       Yes

## 2024-06-27 ENCOUNTER — APPOINTMENT (OUTPATIENT)
Dept: CARDIOLOGY | Facility: CLINIC | Age: 54
End: 2024-06-27
Payer: MEDICAID

## 2024-06-27 ENCOUNTER — NON-APPOINTMENT (OUTPATIENT)
Age: 54
End: 2024-06-27

## 2024-06-27 VITALS — DIASTOLIC BLOOD PRESSURE: 68 MMHG | SYSTOLIC BLOOD PRESSURE: 130 MMHG

## 2024-06-27 DIAGNOSIS — Z95.5 PRESENCE OF CORONARY ANGIOPLASTY IMPLANT AND GRAFT: ICD-10-CM

## 2024-06-27 DIAGNOSIS — I25.10 ATHEROSCLEROTIC HEART DISEASE OF NATIVE CORONARY ARTERY W/OUT ANGINA PECTORIS: ICD-10-CM

## 2024-06-27 LAB
ALBUMIN SERPL ELPH-MCNC: 4.6 G/DL
ALP BLD-CCNC: 107 U/L
ALT SERPL-CCNC: 40 U/L
ANION GAP SERPL CALC-SCNC: 11 MMOL/L
AST SERPL-CCNC: 25 U/L
BILIRUB SERPL-MCNC: 0.5 MG/DL
BUN SERPL-MCNC: 18 MG/DL
CALCIUM SERPL-MCNC: 9 MG/DL
CHLORIDE SERPL-SCNC: 108 MMOL/L
CHOLEST SERPL-MCNC: 132 MG/DL
CO2 SERPL-SCNC: 24 MMOL/L
CREAT SERPL-MCNC: 1 MG/DL
EGFR: 90 ML/MIN/1.73M2
ESTIMATED AVERAGE GLUCOSE: 120 MG/DL
GLUCOSE SERPL-MCNC: 164 MG/DL
HBA1C MFR BLD HPLC: 5.8 %
HCT VFR BLD CALC: 47.4 %
HDLC SERPL-MCNC: 20 MG/DL
HGB BLD-MCNC: 16 G/DL
LDLC SERPL CALC-MCNC: 68 MG/DL
MCHC RBC-ENTMCNC: 31.1 PG
MCHC RBC-ENTMCNC: 33.8 G/DL
MCV RBC AUTO: 92 FL
NONHDLC SERPL-MCNC: 112 MG/DL
PLATELET # BLD AUTO: 224 K/UL
PMV BLD AUTO: 0 /100 WBCS
PMV BLD: 10.7 FL
POTASSIUM SERPL-SCNC: 4.3 MMOL/L
PROT SERPL-MCNC: 7 G/DL
RBC # BLD: 5.15 M/UL
RBC # FLD: 13 %
SODIUM SERPL-SCNC: 143 MMOL/L
TRIGL SERPL-MCNC: 221 MG/DL
WBC # FLD AUTO: 7.29 K/UL

## 2024-06-27 PROCEDURE — 99214 OFFICE O/P EST MOD 30 MIN: CPT | Mod: 25

## 2024-06-27 PROCEDURE — 93000 ELECTROCARDIOGRAM COMPLETE: CPT

## 2024-07-16 ENCOUNTER — EMERGENCY (EMERGENCY)
Facility: HOSPITAL | Age: 54
LOS: 0 days | Discharge: HOME | End: 2024-07-16
Attending: EMERGENCY MEDICINE
Payer: MEDICAID

## 2024-07-16 VITALS
WEIGHT: 209 LBS | SYSTOLIC BLOOD PRESSURE: 138 MMHG | HEART RATE: 63 BPM | DIASTOLIC BLOOD PRESSURE: 75 MMHG | TEMPERATURE: 98 F | RESPIRATION RATE: 16 BRPM | OXYGEN SATURATION: 98 % | HEIGHT: 68 IN

## 2024-07-16 DIAGNOSIS — M54.50 LOW BACK PAIN, UNSPECIFIED: ICD-10-CM

## 2024-07-16 DIAGNOSIS — I25.10 ATHEROSCLEROTIC HEART DISEASE OF NATIVE CORONARY ARTERY WITHOUT ANGINA PECTORIS: ICD-10-CM

## 2024-07-16 DIAGNOSIS — E78.5 HYPERLIPIDEMIA, UNSPECIFIED: ICD-10-CM

## 2024-07-16 DIAGNOSIS — I10 ESSENTIAL (PRIMARY) HYPERTENSION: ICD-10-CM

## 2024-07-16 DIAGNOSIS — Z95.5 PRESENCE OF CORONARY ANGIOPLASTY IMPLANT AND GRAFT: ICD-10-CM

## 2024-07-16 PROCEDURE — 99283 EMERGENCY DEPT VISIT LOW MDM: CPT | Mod: 25

## 2024-07-16 PROCEDURE — 96372 THER/PROPH/DIAG INJ SC/IM: CPT

## 2024-07-16 PROCEDURE — 99284 EMERGENCY DEPT VISIT MOD MDM: CPT

## 2024-07-16 RX ORDER — METHOCARBAMOL 500 MG
2 TABLET ORAL
Qty: 18 | Refills: 0
Start: 2024-07-16 | End: 2024-07-18

## 2024-07-16 RX ORDER — LIDOCAINE HCL 28 MG/G
1 GEL TOPICAL
Qty: 1 | Refills: 0
Start: 2024-07-16

## 2024-07-16 RX ORDER — KETOROLAC TROMETHAMINE 30 MG/ML
15 INJECTION, SOLUTION INTRAMUSCULAR ONCE
Refills: 0 | Status: DISCONTINUED | OUTPATIENT
Start: 2024-07-16 | End: 2024-07-16

## 2024-07-16 RX ORDER — KETOROLAC TROMETHAMINE 30 MG/ML
1 INJECTION, SOLUTION INTRAMUSCULAR
Qty: 15 | Refills: 0
Start: 2024-07-16 | End: 2024-07-20

## 2024-07-16 RX ORDER — METHOCARBAMOL 500 MG
1500 TABLET ORAL ONCE
Refills: 0 | Status: COMPLETED | OUTPATIENT
Start: 2024-07-16 | End: 2024-07-16

## 2024-07-16 RX ORDER — KETOROLAC TROMETHAMINE 30 MG/ML
60 INJECTION, SOLUTION INTRAMUSCULAR ONCE
Refills: 0 | Status: DISCONTINUED | OUTPATIENT
Start: 2024-07-16 | End: 2024-07-16

## 2024-07-16 RX ORDER — LIDOCAINE HCL 28 MG/G
1 GEL TOPICAL ONCE
Refills: 0 | Status: COMPLETED | OUTPATIENT
Start: 2024-07-16 | End: 2024-07-16

## 2024-07-16 RX ORDER — DEXAMETHASONE 1 MG/1
10 TABLET ORAL ONCE
Refills: 0 | Status: COMPLETED | OUTPATIENT
Start: 2024-07-16 | End: 2024-07-16

## 2024-07-16 RX ADMIN — KETOROLAC TROMETHAMINE 60 MILLIGRAM(S): 30 INJECTION, SOLUTION INTRAMUSCULAR at 12:15

## 2024-07-16 RX ADMIN — LIDOCAINE HCL 1 PATCH: 28 GEL TOPICAL at 12:15

## 2024-07-16 RX ADMIN — Medication 1500 MILLIGRAM(S): at 12:15

## 2024-07-16 RX ADMIN — DEXAMETHASONE 10 MILLIGRAM(S): 1 TABLET ORAL at 12:15

## 2024-11-18 ENCOUNTER — APPOINTMENT (OUTPATIENT)
Dept: CARDIOLOGY | Facility: CLINIC | Age: 54
End: 2024-11-18
Payer: MEDICAID

## 2024-11-18 PROCEDURE — 93015 CV STRESS TEST SUPVJ I&R: CPT

## 2024-11-18 NOTE — ED ADULT NURSE NOTE - GENITOURINARY ASSESSMENT
Last Written Prescription Date:  9/26/23  Last Fill Quantity: 90,  # refills: 3   Last office visit: 1/30/2024 ; last virtual visit: 9/18/2024 with prescribing provider:  Dr. Gaming   Future Office Visit: 12/5/24  Next 5 appointments (look out 90 days)      Dec 10, 2024 7:30 AM  Lab visit with [facility]  [facility] ([facility] ) [address] [address]  [phone]     Jan 30, 2025 1:00 PM  (Arrive by 12:40 PM)  Adult Preventative Visit with Mirna Sheehan MD  [facility] ([facility] ) [address], [address]  [facility] [address]  [phone]           Requested Prescriptions   Pending Prescriptions Disp Refills    levothyroxine (SYNTHROID/LEVOTHROID) 50 MCG tablet 90 tablet 3     Sig: Take 1 tablet (50 mcg) by mouth daily.       Thyroid Protocol Passed - 11/18/2024 11:16 AM        Passed - Patient is 12 years or older        Passed - Medication is active on med list        Passed - Recent (12 mo) or future (90 days) visit within the authorizing provider's specialty     The patient must have completed an in-person or virtual visit within the past 12 months or has a future visit scheduled within the next 90 days with the authorizing provider s specialty.  Urgent care and e-visits do not qualify as an office visit for this protocol.          Passed - Medication indicated for associated diagnosis     Medication is associated with one or more of the following diagnoses:  Hypothyroidism  Thyroid stimulating hormone suppression therapy  Thyroid cancer  Acquired atrophy of thyroid          Passed - Normal TSH on file in past 12 months     Recent Labs   Lab Test 05/10/24  0813   TSH 1.30              Passed - No active pregnancy on record     If patient is pregnant or has had a positive pregnancy test, please check TSH.          Passed - No positive pregnancy test in past 12 months     If patient  is pregnant or has had a positive pregnancy test, please check TSH.             Refill sent  Kae Anthony RN     - - -

## 2024-11-19 ENCOUNTER — NON-APPOINTMENT (OUTPATIENT)
Age: 54
End: 2024-11-19

## 2024-11-19 ENCOUNTER — APPOINTMENT (OUTPATIENT)
Dept: CARDIOLOGY | Facility: CLINIC | Age: 54
End: 2024-11-19
Payer: MEDICAID

## 2024-11-19 VITALS
WEIGHT: 208 LBS | SYSTOLIC BLOOD PRESSURE: 132 MMHG | DIASTOLIC BLOOD PRESSURE: 78 MMHG | HEIGHT: 67 IN | BODY MASS INDEX: 32.65 KG/M2

## 2024-11-19 DIAGNOSIS — Z95.5 PRESENCE OF CORONARY ANGIOPLASTY IMPLANT AND GRAFT: ICD-10-CM

## 2024-11-19 DIAGNOSIS — I25.10 ATHEROSCLEROTIC HEART DISEASE OF NATIVE CORONARY ARTERY W/OUT ANGINA PECTORIS: ICD-10-CM

## 2024-11-19 PROCEDURE — 93000 ELECTROCARDIOGRAM COMPLETE: CPT

## 2024-11-19 PROCEDURE — 99213 OFFICE O/P EST LOW 20 MIN: CPT | Mod: 25

## 2024-12-10 ENCOUNTER — RX RENEWAL (OUTPATIENT)
Age: 54
End: 2024-12-10

## 2025-01-21 ENCOUNTER — RX RENEWAL (OUTPATIENT)
Age: 55
End: 2025-01-21

## 2025-01-28 NOTE — ED ADULT TRIAGE NOTE - AS TEMP SITE
Chief Complaint   Patient presents with    3 Month Follow-Up     Pt is struggling with congestion and drainage, hoarseness, improves with continued use.     Pt improved with doxy and steroids, then saw ENT, no cause found.     Symptoms are more present on the right side.     Subjective: (As above and below)     Chief Complaint   Patient presents with    3 Month Follow-Up     she is a 76 y.o. year old female who presents for evaluation.    Reviewed PmHx, RxHx, FmHx, SocHx, AllgHx and updated in chart.    Review of Systems - negative except as listed above    Objective:     Vitals:    01/28/25 0939 01/28/25 0941   BP: (!) 176/77 (!) 184/81   Site: Left Lower Arm Right Lower Arm   Pulse: 75    Resp: 14    Temp: 98 °F (36.7 °C)    SpO2: 96%    Weight: (!) 138.2 kg (304 lb 9.6 oz)    Height: 1.676 m (5' 6\")      Physical Examination: General appearance - alert, well appearing, and in no distress  Mental status - normal mood, behavior, speech, dress, motor activity, and thought processes  Chest - clear to auscultation, no wheezes, rales or rhonchi, symmetric air entry  Heart - normal rate, regular rhythm, normal S1, S2, no murmurs, rubs, clicks or gallops  Musculoskeletal - no joint tenderness, deformity or swelling      Assessment/ Plan:   1. Essential (primary) hypertension  -elevated in office  - CBC with Auto Differential; Future  - Lipid Panel; Future    2. Controlled type 2 diabetes mellitus without complication, without long-term current use of insulin ([facility])  -check labs  - Comprehensive Metabolic Panel; Future  - Hemoglobin A1C; Future    3. Acquired hypothyroidism  - TSH; Future    4. Vitamin D deficiency  - Vitamin D 25 Hydroxy; Future    5. Hoarseness  -take medication as written, ENT eval normal per pt  - doxycycline hyclate (VIBRA-TABS) 100 MG tablet; Take 1 tablet by mouth 2 times daily for 7 days  Dispense: 14 tablet; Refill: 0  - predniSONE (DELTASONE) 10 MG tablet; Take 1 tablet by mouth daily for 10 days 
Evelia Harrison is a 76 y.o. female    Chief Complaint   Patient presents with    3 Month Follow-Up     Vitals:    01/28/25 0939 01/28/25 0941   BP: (!) 176/77 (!) 184/81   Site: Left Lower Arm Right Lower Arm   Pulse: 75    Resp: 14    Temp: 98 °F (36.7 °C)    SpO2: 96%    Weight: (!) 138.2 kg (304 lb 9.6 oz)    Height: 1.676 m (5' 6\")          Health Maintenance Due   Topic Date Due    Diabetic foot exam  10/19/2023    COVID-19 Vaccine (4 - 2023-24 season) 09/01/2024    Diabetic retinal exam  11/15/2024    Respiratory Syncytial Virus (RSV) Pregnant or age 60 yrs+ (1 - 1-dose 75+ series) Never done         \"Have you been to the ER, urgent care clinic since your last visit?  Hospitalized since your last visit?\"    NO    “Have you seen or consulted any other health care providers outside of [facility] since your last visit?”    NO            
oral

## 2025-04-07 ENCOUNTER — APPOINTMENT (OUTPATIENT)
Dept: CARDIOLOGY | Facility: CLINIC | Age: 55
End: 2025-04-07

## 2025-04-21 ENCOUNTER — APPOINTMENT (OUTPATIENT)
Dept: CARDIOLOGY | Facility: CLINIC | Age: 55
End: 2025-04-21
Payer: MEDICAID

## 2025-04-21 DIAGNOSIS — I25.10 ATHEROSCLEROTIC HEART DISEASE OF NATIVE CORONARY ARTERY W/OUT ANGINA PECTORIS: ICD-10-CM

## 2025-04-21 PROCEDURE — 93015 CV STRESS TEST SUPVJ I&R: CPT

## 2025-05-27 ENCOUNTER — RX RENEWAL (OUTPATIENT)
Age: 55
End: 2025-05-27

## 2025-07-08 ENCOUNTER — APPOINTMENT (OUTPATIENT)
Dept: CARDIOLOGY | Facility: CLINIC | Age: 55
End: 2025-07-08
Payer: MEDICAID

## 2025-07-08 VITALS
DIASTOLIC BLOOD PRESSURE: 72 MMHG | BODY MASS INDEX: 32.65 KG/M2 | HEIGHT: 67 IN | SYSTOLIC BLOOD PRESSURE: 120 MMHG | WEIGHT: 208 LBS | HEART RATE: 69 BPM

## 2025-07-08 PROCEDURE — 93000 ELECTROCARDIOGRAM COMPLETE: CPT

## 2025-07-08 PROCEDURE — 99214 OFFICE O/P EST MOD 30 MIN: CPT | Mod: 25
